# Patient Record
Sex: FEMALE | Race: WHITE | NOT HISPANIC OR LATINO | Employment: FULL TIME | URBAN - METROPOLITAN AREA
[De-identification: names, ages, dates, MRNs, and addresses within clinical notes are randomized per-mention and may not be internally consistent; named-entity substitution may affect disease eponyms.]

---

## 2017-07-27 ENCOUNTER — COMMUNICATION - HEALTHEAST (OUTPATIENT)
Dept: FAMILY MEDICINE | Facility: CLINIC | Age: 42
End: 2017-07-27

## 2017-09-21 ENCOUNTER — OFFICE VISIT - HEALTHEAST (OUTPATIENT)
Dept: FAMILY MEDICINE | Facility: CLINIC | Age: 42
End: 2017-09-21

## 2017-09-21 DIAGNOSIS — Z12.31 ENCOUNTER FOR SCREENING MAMMOGRAM FOR MALIGNANT NEOPLASM OF BREAST: ICD-10-CM

## 2017-09-21 DIAGNOSIS — Z12.39 SCREENING FOR BREAST CANCER: ICD-10-CM

## 2017-09-21 DIAGNOSIS — Z12.4 SCREENING FOR CERVICAL CANCER: ICD-10-CM

## 2017-09-21 DIAGNOSIS — R92.30 DENSE BREASTS: ICD-10-CM

## 2017-09-21 DIAGNOSIS — Z20.9 EXPOSURE TO POTENTIAL INFECTION: ICD-10-CM

## 2017-09-21 DIAGNOSIS — Z00.00 WELL ADULT EXAM: ICD-10-CM

## 2017-09-21 ASSESSMENT — MIFFLIN-ST. JEOR: SCORE: 1467.72

## 2017-09-26 LAB
HPV INTERPRETATION - HISTORICAL: NORMAL
HPV INTERPRETER - HISTORICAL: NORMAL

## 2017-10-02 LAB
BKR LAB AP ABNORMAL BLEEDING: NO
BKR LAB AP BIRTH CONTROL/HORMONES: NORMAL
BKR LAB AP CERVICAL APPEARANCE: NORMAL
BKR LAB AP GYN ADEQUACY: NORMAL
BKR LAB AP GYN INTERPRETATION: NORMAL
BKR LAB AP HPV REFLEX: NORMAL
BKR LAB AP LMP: NORMAL
BKR LAB AP PATIENT STATUS: NORMAL
BKR LAB AP PREVIOUS ABNORMAL: NORMAL
BKR LAB AP PREVIOUS NORMAL: NORMAL
HIGH RISK?: YES
PATH REPORT.COMMENTS IMP SPEC: NORMAL
RESULT FLAG (HE HISTORICAL CONVERSION): NORMAL

## 2017-10-03 ENCOUNTER — COMMUNICATION - HEALTHEAST (OUTPATIENT)
Dept: FAMILY MEDICINE | Facility: CLINIC | Age: 42
End: 2017-10-03

## 2017-10-12 ENCOUNTER — COMMUNICATION - HEALTHEAST (OUTPATIENT)
Dept: FAMILY MEDICINE | Facility: CLINIC | Age: 42
End: 2017-10-12

## 2019-01-29 ENCOUNTER — OFFICE VISIT - HEALTHEAST (OUTPATIENT)
Dept: FAMILY MEDICINE | Facility: CLINIC | Age: 44
End: 2019-01-29

## 2019-01-29 DIAGNOSIS — N94.3 PREMENSTRUAL TENSION SYNDROME: ICD-10-CM

## 2019-01-29 DIAGNOSIS — Z12.39 ENCOUNTER FOR OTHER SCREENING FOR MALIGNANT NEOPLASM OF BREAST: ICD-10-CM

## 2019-01-29 DIAGNOSIS — Z00.00 WELL ADULT EXAM: ICD-10-CM

## 2019-01-29 DIAGNOSIS — F41.9 ANXIETY: ICD-10-CM

## 2019-01-29 DIAGNOSIS — E66.812 CLASS 2 OBESITY WITH BODY MASS INDEX (BMI) OF 37.0 TO 37.9 IN ADULT, UNSPECIFIED OBESITY TYPE, UNSPECIFIED WHETHER SERIOUS COMORBIDITY PRESENT: ICD-10-CM

## 2019-01-29 LAB
ALBUMIN SERPL-MCNC: 3.9 G/DL (ref 3.5–5)
ALP SERPL-CCNC: 65 U/L (ref 45–120)
ALT SERPL W P-5'-P-CCNC: 20 U/L (ref 0–45)
ANION GAP SERPL CALCULATED.3IONS-SCNC: 11 MMOL/L (ref 5–18)
AST SERPL W P-5'-P-CCNC: 12 U/L (ref 0–40)
BASOPHILS # BLD AUTO: 0.1 THOU/UL (ref 0–0.2)
BASOPHILS NFR BLD AUTO: 1 % (ref 0–2)
BILIRUB SERPL-MCNC: 0.6 MG/DL (ref 0–1)
BUN SERPL-MCNC: 15 MG/DL (ref 8–22)
CALCIUM SERPL-MCNC: 9.7 MG/DL (ref 8.5–10.5)
CHLORIDE BLD-SCNC: 107 MMOL/L (ref 98–107)
CO2 SERPL-SCNC: 22 MMOL/L (ref 22–31)
CREAT SERPL-MCNC: 0.8 MG/DL (ref 0.6–1.1)
CREAT UR-MCNC: 182.8 MG/DL
CRP SERPL HS-MCNC: 6.7 MG/L (ref 0–3)
EOSINOPHIL # BLD AUTO: 0.1 THOU/UL (ref 0–0.4)
EOSINOPHIL NFR BLD AUTO: 2 % (ref 0–6)
ERYTHROCYTE [DISTWIDTH] IN BLOOD BY AUTOMATED COUNT: 12.7 % (ref 11–14.5)
GFR SERPL CREATININE-BSD FRML MDRD: >60 ML/MIN/1.73M2
GLUCOSE BLD-MCNC: 93 MG/DL (ref 70–125)
HCT VFR BLD AUTO: 38 % (ref 35–47)
HGB BLD-MCNC: 12.4 G/DL (ref 12–16)
INSULIN SERPL-ACNC: 20.1 MU/L (ref 3–25)
IRON SATN MFR SERPL: 15 % (ref 20–50)
IRON SERPL-MCNC: 62 UG/DL (ref 42–175)
LYMPHOCYTES # BLD AUTO: 1.8 THOU/UL (ref 0.8–4.4)
LYMPHOCYTES NFR BLD AUTO: 24 % (ref 20–40)
MCH RBC QN AUTO: 28.7 PG (ref 27–34)
MCHC RBC AUTO-ENTMCNC: 32.6 G/DL (ref 32–36)
MCV RBC AUTO: 88 FL (ref 80–100)
MICROALBUMIN UR-MCNC: 1.45 MG/DL (ref 0–1.99)
MICROALBUMIN/CREAT UR: 7.9 MG/G
MONOCYTES # BLD AUTO: 0.5 THOU/UL (ref 0–0.9)
MONOCYTES NFR BLD AUTO: 7 % (ref 2–10)
NEUTROPHILS # BLD AUTO: 5.1 THOU/UL (ref 2–7.7)
NEUTROPHILS NFR BLD AUTO: 67 % (ref 50–70)
PLATELET # BLD AUTO: 352 THOU/UL (ref 140–440)
PMV BLD AUTO: 7.2 FL (ref 7–10)
POTASSIUM BLD-SCNC: 4.8 MMOL/L (ref 3.5–5)
PROT SERPL-MCNC: 7.1 G/DL (ref 6–8)
RBC # BLD AUTO: 4.32 MILL/UL (ref 3.8–5.4)
SODIUM SERPL-SCNC: 140 MMOL/L (ref 136–145)
THYROID PEROXIDASE ANTIBODIES - HISTORICAL: 5.3 IU/ML (ref 0–5.6)
TIBC SERPL-MCNC: 423 UG/DL (ref 313–563)
TRANSFERRIN SERPL-MCNC: 338 MG/DL (ref 212–360)
TSH SERPL DL<=0.005 MIU/L-ACNC: 1.3 UIU/ML (ref 0.3–5)
VIT B12 SERPL-MCNC: 246 PG/ML (ref 213–816)
WBC: 7.6 THOU/UL (ref 4–11)

## 2019-01-29 ASSESSMENT — MIFFLIN-ST. JEOR: SCORE: 1580.76

## 2019-01-30 LAB
25(OH)D3 SERPL-MCNC: 19.4 NG/ML (ref 30–80)
DHEA-S SERPL-MCNC: 114 UG/DL (ref 32–240)

## 2019-02-01 LAB
CHOLEST SERPL-MCNC: 163 MG/DL
HDL SERPL QN: 10.1 NM
HDL SERPL-SCNC: 40.7 UMOL/L
HDLC SERPL-MCNC: 78 MG/DL (ref 40–59)
HLD.LARGE SERPL-SCNC: 14.8 UMOL/L
LDL SERPL QN: 21.7 NM
LDL SERPL-SCNC: 735 NMOL/L
LDL SMALL SERPL-SCNC: ABNORMAL NMOL/L
LDLC SERPL CALC-MCNC: 63 MG/DL
PATHOLOGY STUDY: ABNORMAL
TRIGL SERPL-MCNC: 110 MG/DL (ref 30–149)
VLDL LARGE SERPL-SCNC: 4.3 NMOL/L
VLDL SERPL QN: 50.2 NM

## 2019-02-09 ENCOUNTER — COMMUNICATION - HEALTHEAST (OUTPATIENT)
Dept: FAMILY MEDICINE | Facility: CLINIC | Age: 44
End: 2019-02-09

## 2019-02-20 ENCOUNTER — HOSPITAL ENCOUNTER (OUTPATIENT)
Dept: MAMMOGRAPHY | Facility: CLINIC | Age: 44
Discharge: HOME | End: 2019-02-20
Attending: FAMILY MEDICINE

## 2019-02-20 DIAGNOSIS — Z12.39 ENCOUNTER FOR OTHER SCREENING FOR MALIGNANT NEOPLASM OF BREAST: ICD-10-CM

## 2019-02-28 ENCOUNTER — COMMUNICATION - HEALTHEAST (OUTPATIENT)
Dept: MAMMOGRAPHY | Facility: CLINIC | Age: 44
End: 2019-02-28

## 2020-04-23 ENCOUNTER — COMMUNICATION - HEALTHEAST (OUTPATIENT)
Dept: FAMILY MEDICINE | Facility: CLINIC | Age: 45
End: 2020-04-23

## 2020-07-14 ENCOUNTER — OFFICE VISIT - HEALTHEAST (OUTPATIENT)
Dept: FAMILY MEDICINE | Facility: CLINIC | Age: 45
End: 2020-07-14

## 2020-07-14 DIAGNOSIS — Z11.4 SCREENING FOR HIV (HUMAN IMMUNODEFICIENCY VIRUS): ICD-10-CM

## 2020-07-14 DIAGNOSIS — F90.9 ATTENTION DEFICIT HYPERACTIVITY DISORDER (ADHD), UNSPECIFIED ADHD TYPE: ICD-10-CM

## 2020-07-14 DIAGNOSIS — Z20.9 EXPOSURE TO POTENTIAL INFECTION: ICD-10-CM

## 2020-07-14 DIAGNOSIS — E66.812 CLASS 2 OBESITY WITH BODY MASS INDEX (BMI) OF 38.0 TO 38.9 IN ADULT, UNSPECIFIED OBESITY TYPE, UNSPECIFIED WHETHER SERIOUS COMORBIDITY PRESENT: ICD-10-CM

## 2020-07-14 ASSESSMENT — MIFFLIN-ST. JEOR: SCORE: 1607.97

## 2020-07-14 NOTE — ASSESSMENT & PLAN NOTE
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

## 2020-07-15 LAB — HIV 1+2 AB+HIV1 P24 AG SERPL QL IA: NEGATIVE

## 2020-07-16 LAB — HEPATITIS B SURFACE ANTIBODY LHE- HISTORICAL: POSITIVE

## 2020-07-17 LAB
GAMMA INTERFERON BACKGROUND BLD IA-ACNC: 0.05 IU/ML
M TB IFN-G BLD-IMP: NEGATIVE
MITOGEN IGNF BCKGRD COR BLD-ACNC: -0.01 IU/ML
MITOGEN IGNF BCKGRD COR BLD-ACNC: 0 IU/ML
QTF INTERPRETATION: NORMAL
QTF MITOGEN - NIL: 6.85 IU/ML
VZV IGG SER QL IA: POSITIVE

## 2020-07-18 ENCOUNTER — COMMUNICATION - HEALTHEAST (OUTPATIENT)
Dept: FAMILY MEDICINE | Facility: CLINIC | Age: 45
End: 2020-07-18

## 2020-08-26 ENCOUNTER — RECORDS - HEALTHEAST (OUTPATIENT)
Dept: ADMINISTRATIVE | Facility: OTHER | Age: 45
End: 2020-08-26

## 2020-10-19 ENCOUNTER — COMMUNICATION - HEALTHEAST (OUTPATIENT)
Dept: FAMILY MEDICINE | Facility: CLINIC | Age: 45
End: 2020-10-19

## 2020-10-19 DIAGNOSIS — F90.9 ATTENTION DEFICIT HYPERACTIVITY DISORDER (ADHD), UNSPECIFIED ADHD TYPE: ICD-10-CM

## 2020-10-22 ENCOUNTER — AMBULATORY - HEALTHEAST (OUTPATIENT)
Dept: FAMILY MEDICINE | Facility: CLINIC | Age: 45
End: 2020-10-22

## 2020-10-22 DIAGNOSIS — F90.9 ATTENTION DEFICIT HYPERACTIVITY DISORDER (ADHD), UNSPECIFIED ADHD TYPE: ICD-10-CM

## 2020-10-24 ENCOUNTER — COMMUNICATION - HEALTHEAST (OUTPATIENT)
Dept: FAMILY MEDICINE | Facility: CLINIC | Age: 45
End: 2020-10-24

## 2020-10-24 DIAGNOSIS — F90.9 ATTENTION DEFICIT HYPERACTIVITY DISORDER (ADHD), UNSPECIFIED ADHD TYPE: ICD-10-CM

## 2020-11-03 ENCOUNTER — COMMUNICATION - HEALTHEAST (OUTPATIENT)
Dept: FAMILY MEDICINE | Facility: CLINIC | Age: 45
End: 2020-11-03

## 2020-11-03 DIAGNOSIS — F90.9 ATTENTION DEFICIT HYPERACTIVITY DISORDER (ADHD), UNSPECIFIED ADHD TYPE: ICD-10-CM

## 2020-12-04 ENCOUNTER — AMBULATORY - HEALTHEAST (OUTPATIENT)
Dept: FAMILY MEDICINE | Facility: CLINIC | Age: 45
End: 2020-12-04

## 2020-12-04 DIAGNOSIS — Z72.0 TOBACCO USE: ICD-10-CM

## 2020-12-04 DIAGNOSIS — Z71.6 ENCOUNTER FOR TOBACCO USE CESSATION COUNSELING: ICD-10-CM

## 2021-01-23 ENCOUNTER — COMMUNICATION - HEALTHEAST (OUTPATIENT)
Dept: FAMILY MEDICINE | Facility: CLINIC | Age: 46
End: 2021-01-23

## 2021-01-26 ENCOUNTER — AMBULATORY - HEALTHEAST (OUTPATIENT)
Dept: NURSING | Facility: CLINIC | Age: 46
End: 2021-01-26

## 2021-02-15 ENCOUNTER — COMMUNICATION - HEALTHEAST (OUTPATIENT)
Dept: FAMILY MEDICINE | Facility: CLINIC | Age: 46
End: 2021-02-15

## 2021-02-16 ENCOUNTER — AMBULATORY - HEALTHEAST (OUTPATIENT)
Dept: NURSING | Facility: CLINIC | Age: 46
End: 2021-02-16

## 2021-03-18 ENCOUNTER — COMMUNICATION - HEALTHEAST (OUTPATIENT)
Dept: FAMILY MEDICINE | Facility: CLINIC | Age: 46
End: 2021-03-18

## 2021-04-01 ENCOUNTER — COMMUNICATION - HEALTHEAST (OUTPATIENT)
Dept: FAMILY MEDICINE | Facility: CLINIC | Age: 46
End: 2021-04-01

## 2021-04-01 DIAGNOSIS — F90.9 ATTENTION DEFICIT HYPERACTIVITY DISORDER (ADHD), UNSPECIFIED ADHD TYPE: ICD-10-CM

## 2021-04-04 RX ORDER — DEXTROAMPHETAMINE SACCHARATE, AMPHETAMINE ASPARTATE MONOHYDRATE, DEXTROAMPHETAMINE SULFATE AND AMPHETAMINE SULFATE 3.75; 3.75; 3.75; 3.75 MG/1; MG/1; MG/1; MG/1
15 CAPSULE, EXTENDED RELEASE ORAL DAILY
Qty: 90 CAPSULE | Refills: 0 | Status: SHIPPED | OUTPATIENT
Start: 2021-04-04 | End: 2022-05-28

## 2021-04-12 ENCOUNTER — OFFICE VISIT - HEALTHEAST (OUTPATIENT)
Dept: FAMILY MEDICINE | Facility: CLINIC | Age: 46
End: 2021-04-12

## 2021-04-12 DIAGNOSIS — Z11.59 ENCOUNTER FOR HEPATITIS C SCREENING TEST FOR LOW RISK PATIENT: ICD-10-CM

## 2021-04-12 DIAGNOSIS — F90.9 ATTENTION DEFICIT HYPERACTIVITY DISORDER (ADHD), UNSPECIFIED ADHD TYPE: ICD-10-CM

## 2021-04-12 NOTE — ASSESSMENT & PLAN NOTE
"Really great \"  Meds  Tie >> helps so do not binge eat  Increased focus\"    Down sides   \"have not found any\"  Sometimes I forget    \"can't take past 10Am >> won't sleep   12 hours     Heart Palpitation  No   GI No   No Sweats       Covered?  only name brand   "

## 2021-05-25 ENCOUNTER — RECORDS - HEALTHEAST (OUTPATIENT)
Dept: ADMINISTRATIVE | Facility: CLINIC | Age: 46
End: 2021-05-25

## 2021-05-31 ENCOUNTER — RECORDS - HEALTHEAST (OUTPATIENT)
Dept: ADMINISTRATIVE | Facility: CLINIC | Age: 46
End: 2021-05-31

## 2021-05-31 VITALS — WEIGHT: 187.08 LBS | BODY MASS INDEX: 33.15 KG/M2 | HEIGHT: 63 IN

## 2021-06-01 ENCOUNTER — RECORDS - HEALTHEAST (OUTPATIENT)
Dept: ADMINISTRATIVE | Facility: CLINIC | Age: 46
End: 2021-06-01

## 2021-06-02 VITALS — WEIGHT: 212 LBS | BODY MASS INDEX: 37.56 KG/M2 | HEIGHT: 63 IN

## 2021-06-04 VITALS
DIASTOLIC BLOOD PRESSURE: 80 MMHG | BODY MASS INDEX: 38.62 KG/M2 | SYSTOLIC BLOOD PRESSURE: 110 MMHG | OXYGEN SATURATION: 99 % | WEIGHT: 218 LBS | HEART RATE: 89 BPM | HEIGHT: 63 IN

## 2021-06-09 NOTE — PROGRESS NOTES
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    Diagnoses and all orders for this visit:    Exposure to potential infection  -     Hepatitis B Surface Antibody (Anti-HBs)  -     Varicella Zoster Antibody, IgG  -     QTF-Mycobacterium tuberculosis by QuantiFERON-TB Gold Plus    Class 2 obesity with body mass index (BMI) of 38.0 to 38.9 in adult, unspecified obesity type, unspecified whether serious comorbidity present    Screening for HIV (human immunodeficiency virus)  -     HIV Antigen/Antibody Screening [place]    Attention deficit hyperactivity disorder (ADHD), unspecified ADHD type  -     dextroamphetamine-amphetamine (ADDERALL XR) 15 MG 24 hr capsule; Take 1 capsule (15 mg total) by mouth daily.        Patient Instructions   Mammogram consider doing a 3D mammogram this year or for sure next year  Any changes in your breast eyes get checked out    Pap smear is due in 2022        For attention deficit issues  Adderall 15 mg at 8:00  Let me know about side effects    Consider green tea extract  Another consideration berberine 500 mg 2 tablets daily for up to 3 months    Definitely stress and cortisol levels plan to weight gain and weight retention       Consider doing testing down the line for adrenal fatigue      Supplements for adrenal support  These can be used in addition to lifestyle interventions such as mindfulness  exercises, meditation, cardiovascular low impact exercise, and restorative sleep      Therapies for Supporting  Mood Regulation     5 HTP 50 -400 mg Daily  L Taurine 100- 1000 mg Daily  Pharma TERENCE 100- 400 mg Daily  L Theanine 100 - 600 mg Daily  Green tea  L Tyrosine  200 - 2000 mg Daily  Magnesium Glycinate or Citrate 400 mg Daily  5 Methytetrahydrofolate 400 mcg to 1 mg daily  Inositol 1000 0 59027 mg daily    Paula Adrenal Health  Life Extension Adrenal Formula    Therapies to Support Sleep    5 HTp 100 - 500mg Daily  L Theanine 100 - 600 mg Daily  PharmaGABA 100 - 600 mg Daily  Calcium 200 - 1000 mg Daily  Magnesium (prefer Glycinate if no constipation and Citrate if constipation) 100 - 800 mg Daily  Phosphatidyl serine 500 mg Daily  Valerian Root \Passion Flower \Jujube Extract    Herbs to help with  Inflammation  If Elevated hs CRP    Turmeric 600-1200 mg Daily  SkullCap 200 - 600m Daily  Green tea or Extract 100 - 300 mg Daily  Quercitin 200 - 600 mg Daily   Onion   Vitamin D / K2  5000/ Daily goal level 60   Bromelain 240 - 500 mg Daily    Over-the-counter examples of adrenal support supplements:    These are Whole Foods brands      Whole Foods Brand 25 $  2 months supply  Stress Support  Ashwagandha  Lemon [place]  Bacopa  Passion Flower  RHodiola  Holy Basil    Adrenal Support Whole Foods 25$ 2 months supply  Ashwagandha  Eleuthero  Cordyceps  Schizandra  American Ginseng  Chinese Ginseng  Rhodiola                        Work on weight re-balance!    Taking control of your weight and weight re-balance:    1 eat a balanced breakfast every morning consider intermittent fasting with no food before 12:00 and no food after 8:00 cluster eating for an 8-hour.  Low sugar  Low alcohol  Focusing on whole foods  Trying to increase the fiber in your diet up to 30 g daily    Focused Nutrition:  Eat Clean and Whole Foods  These are single ingredient foods that possess vital nutrients which can touch and affect our  "health in a positive manner.            Lean Meats Protein and Fat---- Nuts, Eggs,Veggies, Fish and Lean Meats especially fish and poultry. Meat and Eggs in their natural form have No Sugar.  Try to choose good fat from Fish, Nuts, Avocados, Extra Virgin Olive Oil (not cooked) other vegetables for example. Opt for Plant Proteins and Fats over Animal Fats      For High Protein Eat---- Meats, Fish, Lean Meats, Beans, Nuts, and Quinoa/ other Whole grains    Focus on \"Whole Foods\":  You know what the food is by looking at it and comes from a plant, tree, animal or the sea.  Single source organically grown if possible.    Look for Low Glycemic foods:  Try to Avoid foods with any added sugars and absolutely  no High Fructose Corn Syrup    Lean Meats Protein and Fat: These in their natural form have No Sugar.  Go for the good fat from Fish, Nuts, Avocados, Extra Virgin Olive Oil (not cooked) other vegetables for example. Opt for Plant Proteins and Fats over Animal Fats    Beans are excellent complex carbohydrates with fiber- black, garbanzo, lentil, kidney, lima, Richard, Underwood    Simple flours and breads, sigh,  in general are simple sugars, when processed and should be avoided.  However there are beneficial fibers in wholes grains.  So if choosing, go for the 100% whole-grain Grains --Bran, brown rice, Flax----Fiber offsets glycemic affect    Vegetables: most vegetables are low glycemic with the exception of starchy ones:  potatoes, carrots, corn, and beats    Whole Fruits mostly are low glycemic:  try to eat his snacks: Try not to pair with fat.  Insulin burns sugar and stores fat as a job     High glycemic fruits: Dates, Watermelon, Figgs, Apricots, Raisins but in moderation OK    For Snacks go for nuts and seeds unless you are instructed not to do so due to another health condition; please although tempting avoid corn chips, jellybeans, pretzels other processed snacks that usually pair sugar and fat.      Probiotics and " Prebiotics support digestion and our immune system!    Foods that support this are, among others:    Pros: Kefir, Kombucha, Miso, Pickled Vegetables, Matt Kraut, Yogurt, Tempeh,     Pre: Asparagus, Bananas, Eggplant, Garlic, Honey, Kefir, Leeks, Legumes, Onions, Peas, Whole Grains, and Yogurt    Nutrients support our cellular machinery:  High nutrient food support this.    Rest helps digest.  We need restorative sleep  8- 10 hours.  We should also try to have 10 to 12 hours at some point between meals, e.g. 7 PM to 7 AM     Fiber: Lino Seed or Flax Seed or Hemp Seeds:  2-3 tablespoons daily for fiber and Omega 3s      Move everyday your body and sweat!    Get good 8 to 10 hours of Sleep and Hydrate with Water      If your Triglycerides are High:    Look into a Ketogenic Diet    Always choose --No added Sugar..    Fish Oil 2000 mg Daily and/or Flax or Lino seeds  Milled 2 tablespoons                            Daily    Lino seed in [place] Milk over night to make pudding    Nuts Especially walnuts.    Fish especially  [place], Mackerel, Anchovy,Sardine and Herring    Vegetables opt for multiple colors daily  New Goal 3- 5 cups of fruits and                         veggies Daily!!      Fermented Foods Rock!  You can do your own preserving and culturing of good bacteria!      Drink fermented Kombuchaa  Eat Cultured vegetable like Kimchi or Sauerkraut  Apple Cider Vinegar Unfiltered can be added 8 oz fizzy water  Foods:  Beets, Celery, Lemon, Lime, Grapefruit, Cucumber and Carrots    Use Bitter Herbs:  Irma, Arugala, Cilantro, Turmeric, Dandelion, CUmin, Fennel, Mint, Leeks, Parsley, and Milk THistle    Practice Fastin hours from last meal in evening to first meal in morming    Chlorophyll Rich Foods may help, add to water CHorella or Spirulina    Eat Kale and Broccoli Sprouts --- Sulfurophane rich      Eat Cruciferous Vegetables Daily:    Broccoli, Cauliflower, Kale, Collards, Brussel Sprouts    Eat Lots of  "Fiber:      Soluble:   Lino, Flax Hemp, Pumpkin Seeds  Insoluble:  Fruits and Veggies  Best sources of  Chicory Root Ground, Dandelion Root, Asparagus, Leeks and Onion, Bananas ( more green), Sprouted Wheat eg Med Bread , Garlic, [place] Artichokes,)       Lastly we have to think of things that are toxic to our health, which may contribute to poor health and disease.  An apple covered in pesticides has both healthy and toxic components for our system.  The very \"healthy choices\" may be laced with toxins.  So choose foods wisely and discriminatingly.      Here are some recommendations about when and when not to choose organic foods.  When in doubt wash!      The group identified the following items on its  Dirty Dozen  list of produce with the most pesticide residue:   1. Strawberries  2. Spinach  3. Nectarines  4. Apples  5. Grapes  6. Peaches  7. Cherries  8. Pears  9. Tomatoes  10. Celery  11. Potatoes  12. Sweet Bell Peppers  Here are the items the EWG identified for its  Clean 15,  which report the least likelihood to contain pesticide residue.  1. Avocados  2. Sweet Corn  3. Pineapples  4. Cabbages  5. Onions  6. Sweet Peas  7. Papayas  8. Asparagus  9. Mangoes  10. Eggplants  11. Honeydews  12. Kiwis  13. Cantaloupes  14. Cauliflower  15. Broccoli          Eat well, Get Good Sleep and Stay Active!    Rebecca Fernandez        Return in about 1 month (around 8/14/2020) for my chart follow medication side effects.       Little interest or pleasure in doing things: Not at all  Feeling down, depressed, or hopeless: Not at all    CHIEF COMPLAINT: Yamilet Christensen had concerns including Annual Exam (non fasting, vaccines for NP school ,Tb gold ).    [place]: 1.............. SUBJECTIVE:  Yamilet Christensen is a 44 y.o. female had concerns including Annual Exam (non fasting, vaccines for NP school ,Tb gold ).    1. Exposure to potential infection    2. Class 2 obesity with body mass index (BMI) of 38.0 to 38.9 in adult, " "unspecified obesity type, unspecified whether serious comorbidity present    3. Screening for HIV (human immunodeficiency virus)    4. Attention deficit hyperactivity disorder (ADHD), unspecified ADHD type          Allergies   Allergen Reactions     Sulfa (Sulfonamide Antibiotics)                          SOCIAL: She  reports that she has never smoked. She has never used smokeless tobacco.    REVIEW OF SYSTEMS:   Family history not pertinent to chief complaint or presenting problem    Review of systems otherwise negative as requested from patient, except   Those positive ROS outlined and discussed in [place].      VITALS:  Vitals:    07/14/20 1619   BP: 110/80   Pulse: 89   SpO2: 99%   Weight: 218 lb (98.9 kg)   Height: 5' 3\" (1.6 m)     Wt Readings from Last 3 Encounters:   07/14/20 218 lb (98.9 kg)   01/29/19 212 lb (96.2 kg)   09/21/17 187 lb 1.3 oz (84.9 kg)     Body mass index is 38.62 kg/m .    Physical Exam:  Oropharynx clear  TMs are clear  Tongue-tied  No cervical or supraclavicular nodes  Thyroid prep nontender without nodules  Lungs are clear  Cardiac no murmur  No appreciable dysplastic nevi of the back  No lower extremity edema  Mild valgus deformity of the knees  Truncal obesity  BMI 38       I spent 35 minutes with this patient face to face, of which 50% or greater was spent in counseling and coordination of care with regards to Yamilet was seen today for annual exam.    Diagnoses and all orders for this visit:    Exposure to potential infection  -     Hepatitis B Surface Antibody (Anti-HBs)  -     Varicella Zoster Antibody, IgG  -     QTF-Mycobacterium tuberculosis by QuantiFERON-TB Gold Plus    Class 2 obesity with body mass index (BMI) of 38.0 to 38.9 in adult, unspecified obesity type, unspecified whether serious comorbidity present    Screening for HIV (human immunodeficiency virus)  -     HIV Antigen/Antibody Screening [place]    Attention deficit hyperactivity disorder (ADHD), unspecified ADHD " type  -     dextroamphetamine-amphetamine (ADDERALL XR) 15 MG 24 hr capsule; Take 1 capsule (15 mg total) by mouth daily.        Mahendra Arceo MD  [facility] [address] [phone]

## 2021-06-09 NOTE — PATIENT INSTRUCTIONS - HE
Mammogram consider doing a 3D mammogram this year or for sure next year  Any changes in your breast eyes get checked out    Pap smear is due in 2022        For attention deficit issues  Adderall 15 mg at 8:00  Let me know about side effects    Consider green tea extract  Another consideration berberine 500 mg 2 tablets daily for up to 3 months    Definitely stress and cortisol levels plan to weight gain and weight retention       Consider doing testing down the line for adrenal fatigue      Supplements for adrenal support  These can be used in addition to lifestyle interventions such as mindfulness exercises, meditation, cardiovascular low impact exercise, and restorative sleep      Therapies for Supporting  Mood Regulation     5 HTP 50 -400 mg Daily  L Taurine 100- 1000 mg Daily  Pharma TERENCE 100- 400 mg Daily  L Theanine 100 - 600 mg Daily  Green tea  L Tyrosine  200 - 2000 mg Daily  Magnesium Glycinate or Citrate 400 mg Daily  5 Methytetrahydrofolate 400 mcg to 1 mg daily  Inositol 1000 0 84475 mg daily    Paula Adrenal Health  Life Extension Adrenal Formula    Therapies to Support Sleep    5 HTp 100 - 500mg Daily  L Theanine 100 - 600 mg Daily  PharmaGABA 100 - 600 mg Daily  Calcium 200 - 1000 mg Daily  Magnesium (prefer Glycinate if no constipation and Citrate if constipation) 100 - 800 mg Daily  Phosphatidyl serine 500 mg Daily  Valerian Root \Passion Flower \Jujube Extract    Herbs to help with  Inflammation  If Elevated hs CRP    Turmeric 600-1200 mg Daily  SkullCap 200 - 600m Daily  Green tea or Extract 100 - 300 mg Daily  Quercitin 200 - 600 mg Daily   Onion   Vitamin D / K2  5000/ Daily goal level 60   Bromelain 240 - 500 mg Daily    Over-the-counter examples of adrenal support supplements:    These are Whole Foods brands      Whole Foods Brand 25 $  2 months supply  Stress Support  Ashwagandha  Lemon [place]  Bacopa  Passion Flower  RHodiola  Holy Basil    Adrenal Support Whole Foods 25$ 2 months  "supply  Thomas Veloz  Schizandra  American Ginseng  Chinese Ginseng  Rhodiola                        Work on weight re-balance!    Taking control of your weight and weight re-balance:    1 eat a balanced breakfast every morning consider intermittent fasting with no food before 12:00 and no food after 8:00 cluster eating for an 8-hour.  Low sugar  Low alcohol  Focusing on whole foods  Trying to increase the fiber in your diet up to 30 g daily    Focused Nutrition:  Eat Clean and Whole Foods  These are single ingredient foods that possess vital nutrients which can touch and affect our health in a positive manner.            Lean Meats Protein and Fat---- Nuts, Eggs,Veggies, Fish and Lean Meats especially fish and poultry. Meat and Eggs in their natural form have No Sugar.  Try to choose good fat from Fish, Nuts, Avocados, Extra Virgin Olive Oil (not cooked) other vegetables for example. Opt for Plant Proteins and Fats over Animal Fats      For High Protein Eat---- Meats, Fish, Lean Meats, Beans, Nuts, and Quinoa/ other Whole grains    Focus on \"Whole Foods\":  You know what the food is by looking at it and comes from a plant, tree, animal or the sea.  Single source organically grown if possible.    Look for Low Glycemic foods:  Try to Avoid foods with any added sugars and absolutely  no High Fructose Corn Syrup    Lean Meats Protein and Fat: These in their natural form have No Sugar.  Go for the good fat from Fish, Nuts, Avocados, Extra Virgin Olive Oil (not cooked) other vegetables for example. Opt for Plant Proteins and Fats over Animal Fats    Beans are excellent complex carbohydrates with fiber- black, garbanzo, lentil, kidney, lima, Richard, [place]    Simple flours and breads, sigh,  in general are simple sugars, when processed and should be avoided.  However there are beneficial fibers in wholes grains.  So if choosing, go for the 100% whole-grain Grains --Bran, brown rice, Flax----Fiber offsets " glycemic affect    Vegetables: most vegetables are low glycemic with the exception of starchy ones:  potatoes, carrots, corn, and beats    Whole Fruits mostly are low glycemic:  try to eat his snacks: Try not to pair with fat.  Insulin burns sugar and stores fat as a job     High glycemic fruits: Dates, Watermelon, Figgs, Apricots, Raisins but in moderation OK    For Snacks go for nuts and seeds unless you are instructed not to do so due to another health condition; please although tempting avoid corn chips, jellybeans, pretzels other processed snacks that usually pair sugar and fat.      Probiotics and Prebiotics support digestion and our immune system!    Foods that support this are, among others:    Pros: Kefir, Kombucha, Miso, Pickled Vegetables, Matt Kraut, Yogurt, Tempeh,     Pre: Asparagus, Bananas, Eggplant, Garlic, Honey, Kefir, Leeks, Legumes, Onions, Peas, Whole Grains, and Yogurt    Nutrients support our cellular machinery:  High nutrient food support this.    Rest helps digest.  We need restorative sleep  8- 10 hours.  We should also try to have 10 to 12 hours at some point between meals, e.g. 7 PM to 7 AM     Fiber: Lino Seed or Flax Seed or Hemp Seeds:  2-3 tablespoons daily for fiber and Omega 3s      Move everyday your body and sweat!    Get good 8 to 10 hours of Sleep and Hydrate with Water      If your Triglycerides are High:    Look into a Ketogenic Diet    Always choose --No added Sugar..    Fish Oil 2000 mg Daily and/or Flax or Lino seeds  Milled 2 tablespoons                            Daily    Lino seed in [place] Milk over night to make pudding    Nuts Especially walnuts.    Fish especially  [place], Mackerel, Anchovy,Sardine and Herring    Vegetables opt for multiple colors daily  New Goal 3- 5 cups of fruits and                         veggies Daily!!      Fermented Foods Rock!  You can do your own preserving and culturing of good bacteria!      Drink fermented Kombuchaa  Eat Cultured  "vegetable like Kimchi or Sauerkraut  Apple Cider Vinegar Unfiltered can be added 8 oz fizzy water  Foods:  Beets, Celery, Lemon, Lime, Grapefruit, Cucumber and Carrots    Use Bitter Herbs:  Irma, Arugala, Cilantro, Turmeric, Dandelion, CUmin, Fennel, Mint, Leeks, Parsley, and Milk THistle    Practice Fastin hours from last meal in evening to first meal in morming    Chlorophyll Rich Foods may help, add to water CHorella or Spirulina    Eat Kale and Broccoli Sprouts --- Sulfurophane rich      Eat Cruciferous Vegetables Daily:    Broccoli, Cauliflower, Kale, Collards, Brussel Sprouts    Eat Lots of Fiber:      Soluble:   Lino, Flax Hemp, Pumpkin Seeds  Insoluble:  Fruits and Veggies  Best sources of  Chicory Root Ground, Dandelion Root, Asparagus, Leeks and Onion, Bananas ( more green), Sprouted Wheat eg Med Bread , Garlic, [place] Artichokes,)       Lastly we have to think of things that are toxic to our health, which may contribute to poor health and disease.  An apple covered in pesticides has both healthy and toxic components for our system.  The very \"healthy choices\" may be laced with toxins.  So choose foods wisely and discriminatingly.      Here are some recommendations about when and when not to choose organic foods.  When in doubt wash!      The group identified the following items on its  Dirty Dozen  list of produce with the most pesticide residue:   1. Strawberries  2. Spinach  3. Nectarines  4. Apples  5. Grapes  6. Peaches  7. Cherries  8. Pears  9. Tomatoes  10. Celery  11. Potatoes  12. Sweet Bell Peppers  Here are the items the EWG identified for its  Clean 15,  which report the least likelihood to contain pesticide residue.  1. Avocados  2. Sweet Corn  3. Pineapples  4. Cabbages  5. Onions  6. Sweet Peas  7. Papayas  8. Asparagus  9. Mangoes  10. Eggplants  11. Honeydews  12. Kiwis  13. Cantaloupes  14. Cauliflower  15. Broccoli          Eat well, Get Good Sleep and Stay " Active!    DanL      DanL

## 2021-06-12 NOTE — TELEPHONE ENCOUNTER
Controlled Substance Refill Request  Medication Name:   Requested Prescriptions     Pending Prescriptions Disp Refills     dextroamphetamine-amphetamine (ADDERALL XR) 15 MG 24 hr capsule 90 capsule 0     Sig: Take 1 capsule (15 mg total) by mouth daily.     Date Last Fill: 10/26/20  Requested Pharmacy: [facility]  Submit electronically to pharmacy  Controlled Substance Agreement on file:   Encounter-Level CSA Scan Date:    There are no encounter-level csa scan date.        Last office visit:  7/14/20

## 2021-06-12 NOTE — TELEPHONE ENCOUNTER
Controlled Substance Refill Request  Medication Name:   Requested Prescriptions     Pending Prescriptions Disp Refills     dextroamphetamine-amphetamine (ADDERALL XR) 15 MG 24 hr capsule 90 capsule 0     Sig: Take 1 capsule (15 mg total) by mouth daily.     Date Last Fill: 7/14/20  Requested Pharmacy: [facility]  Submit electronically to pharmacy  Controlled Substance Agreement on file:   Encounter-Level CSA Scan Date:    There are no encounter-level csa scan date.        Last office visit:  7/14/20

## 2021-06-13 NOTE — PROGRESS NOTES
CHIEF COMPLAINT: Yamilet Christensen had concerns including Annual Exam.    Circle: 1.............. had concerns including Annual Exam.    1. Dense breasts    2. Well adult exam    3. Screening for cervical cancer    4. Exposure to potential infection    5. Screening for breast cancer    6. Encounter for screening mammogram for malignant neoplasm of breast      No problem-specific Assessment & Plan notes found for this encounter.      CC:              Physical    No complaints  No family history of breast: Skin ovarian cervical cancer  Sister with hypertension  2 children   safe relationship business owner      SUBJECTIVE:  Yamilet Christensen is a 41 y.o. female    No past medical history on file.  Past Surgical History:   Procedure Laterality Date     KS REDUCTION OF LARGE BREAST      Description: Breast Surgery Reduction Procedure;  Recorded: 07/24/2008;     Sulfa (sulfonamide antibiotics)  No current outpatient prescriptions on file.     No current facility-administered medications for this visit.      No family history on file.  Social History     Social History     Marital status:      Spouse name: N/A     Number of children: N/A     Years of education: N/A     Social History Main Topics     Smoking status: Never Smoker     Smokeless tobacco: None     Alcohol use None     Drug use: None     Sexual activity: Not Asked     Other Topics Concern     None     Social History Narrative     Patient Active Problem List   Diagnosis     Dysplastic Nevus     Premenstrual Disorder     Well adult exam                                              SOCIAL: She  reports that she has never smoked. She does not have any smokeless tobacco history on file.    REVIEW OF SYSTEMS:     FAMILY HISTORY NOT PERTINENT TO CHIEF COMPLAINT OR PRESENTING PROBLEM  Review of systems otherwise negative as requested from patient, except   Positive ROS outlined and discussed in Circle.    OBJECTIVE:  /72 (Patient Site: Left Arm, Patient  "Position: Sitting, Cuff Size: Adult Large)  Pulse 88  Temp 98.7  F (37.1  C) (Oral)   Resp 17  Ht 5' 3\" (1.6 m)  Wt 187 lb 1.3 oz (84.9 kg)  LMP 08/31/2017  BMI 33.14 kg/m2    GENERAL:     No acute distress.   Alert and oriented X 3         Physical:    Sclera clear  TMs are clear  His mucosa is clear  Oropharynx is clear  Neck is supple no cervical supraclavicular clavicular nodes  Thyroid prep nontender without nodules  Abdomen soft nontender with positive bowel sounds extremities are tender without lesions Pap smear taken wet prep taken bimanual exam no negative self ulcer tenderness  No skin dysplastic nevi  No skin rash  No lower extremity edema normal distal pulses next      ASSESSMENT & PLAN      Yamilet was seen today for annual exam.    Diagnoses and all orders for this visit:    Dense breasts  -     Mammo Screening Bilateral; Future    Well adult exam  -     NMR with Lipid  -     Comprehensive Metabolic Panel  -     HM1(CBC and Differential)  -     C -Reactive Protein, High Sensitivity  -     Vitamin D, Total (25-Hydroxy)  -     Urinalysis-UC if Indicated  -     Thyroid Cascade  -     HM1 (CBC with Diff)    Screening for cervical cancer  -     Gynecologic Cytology (PAP Smear)    Exposure to potential infection  -     Wet Prep, Vaginal    Screening for breast cancer    Encounter for screening mammogram for malignant neoplasm of breast  -     Mammo Screening Bilateral; Future    Other orders  -     Tdap vaccine,  6yo or older,  IM        No Follow-up on file.       Anticipatory Guidance and Symptomatic Cares Discussed   Advised to call back directly if there are further questions, or if these symptoms fail to improve as anticipated or worsen.  Return to clinic if patient has a clinical concern that warrants an exam.        30  Min Total Time, > 50% counseling and coordination of Care    Mahendra Arceo MD  Family Medicine   [facility] [id number] [phone]    "

## 2021-06-16 PROBLEM — Z00.00 WELL ADULT EXAM: Status: ACTIVE | Noted: 2017-09-21

## 2021-06-16 PROBLEM — F90.9 ATTENTION DEFICIT HYPERACTIVITY DISORDER (ADHD), UNSPECIFIED ADHD TYPE: Status: ACTIVE | Noted: 2020-07-14

## 2021-06-16 PROBLEM — E66.812 CLASS 2 OBESITY WITH BODY MASS INDEX (BMI) OF 38.0 TO 38.9 IN ADULT, UNSPECIFIED OBESITY TYPE, UNSPECIFIED WHETHER SERIOUS COMORBIDITY PRESENT: Status: ACTIVE | Noted: 2020-07-14

## 2021-06-16 NOTE — TELEPHONE ENCOUNTER
Controlled Substance Refill Request  Medication Name:   Requested Prescriptions     Pending Prescriptions Disp Refills     dextroamphetamine-amphetamine (ADDERALL XR) 15 MG 24 hr capsule 90 capsule 0     Sig: Take 1 capsule (15 mg total) by mouth daily.     Date Last Fill: 11/6/20  Requested Pharmacy: [facility]  Submit electronically to pharmacy  Controlled Substance Agreement on file:   Encounter-Level CSA Scan Date:    There are no encounter-level csa scan date.        Last office visit:  7/14/20

## 2021-06-16 NOTE — PROGRESS NOTES
"Yamilet Christensen is a 45 y.o. female who is being evaluated via a billable video visit.      How would you like to obtain your AVS? MyChart.  If dropped from the video visit, the video invitation should be resent by: Text to cell phone: [phone]   Will anyone else be joining your video visit? No      Video Start Time:     Start: 04/12/2021 07:22 am  Stop: 04/12/2021 07:30 am    Subjective   Yamilet Christensen is 45 y.o. and presents today for the following health issues   HPI     Problem List Items Addressed This Visit     Attention deficit hyperactivity disorder (ADHD), unspecified ADHD type     Really great \"  Meds  Tie >> helps so do not binge eat  Increased focus\"    Down sides   \"have not found any\"  Sometimes I forget    \"can't take past 10Am >> won't sleep   12 hours     Heart Palpitation  No   GI No   No Sweats       Covered?  only name brand            Other Visit Diagnoses     Encounter for hepatitis C screening test for low risk patient    -  Primary    Relevant Orders    Hepatitis C Antibody (Anti-HCV)            Review of Systems  see note       Objective       Vitals:  No vitals were obtained today due to virtual visit.    Physical Exam  Full range of affect              Video-Visit Details    Type of service:  Video Visit    Video End Time (time video stopped): 7:32 AM  Originating Location (pt. Location): Home    Distant Location (provider location):  [facility]     Platform used for Video Visit: Aide    "

## 2021-06-16 NOTE — PATIENT INSTRUCTIONS - HE
Doing well    Continue present medications    Follow-up in 6 months    Medications working well to maintain focus      Be mindful of taking the medications later as this has been causing some insomnia    Rebecca

## 2021-06-20 NOTE — LETTER
Letter by Mahendra Areco MD at      Author: Mahendra Arceo MD Service: -- Author Type: --    Filed:  Encounter Date: 7/14/2020 Status: (Other)         [facility]/OB   07/14/20    Patient: Yamilet Christensen  YOB: 1975  Medical Record Number: [id number]                                                                  Opioid / Opioid Plus Controlled Substance Agreement    I understand that my care provider has prescribed an opioid (narcotic) controlled substance to help manage my condition(s). I am taking this medicine to help me function or work. I know this is strong medicine, and that it can cause serious side effects. Opioid medicine can be sedating, addicting and may cause a dependency on the drug. They can affect my ability to drive or think, and cause depression. They need to be taken exactly as prescribed. Combining opioids with certain medicines or chemicals (such as cocaine, sedatives and tranquilizers, sleeping pills, meth) can be dangerous or even fatal. Also, if I stop opioids suddenly, I may have severe withdrawal symptoms. Last, I understand that opioids do not work for all types of pain nor for all patients. If not helpful, I may be asked to stop them.        The risks, benefits, and side effects of these medicine(s) were explained to me. I agree that:    1. I will take part in other treatments as advised by my care team. This may be psychiatry or counseling, physical therapy, behavioral therapy, group treatment or a referral to a pain clinic. I will reduce or stop my medicine when my care team tells me to do so.  2. I will take my medicines as prescribed. I will not change the dose or schedule unless my care team tells me to. There will be no refills if I run out early.  I may be contactedwithout warning and asked to complete a urine drug test or pill count at any time.   3. I will keep all my appointments, and understand this is part of the monitoring of opioids. My care  team may require an office visit for EVERY opioid/controlled substance refill. If I miss appointments or dont follow instructions, my care team may stop my medicine.  4. I will not ask other providers to prescribe controlled substances, and I will not accept controlled substances from other people. If I need another prescribed controlled substance for a new reason, I will tell my care team within 1 business day.  5. I will use one pharmacy to fill all of my controlled substance prescriptions, and it is up to me to make sure that I do not run out of my medicines on weekends or holidays. If my care team is willing to refill my opioid prescription without a visit, I must request refills only during office hours, refills may take up to 3 days to process, and it may take up to 5 to 7 days for my medicine to be mailed and ready at my pharmacy. Prescriptions will not be mailed anywhere except my pharmacy.        [id number]  Rev 12/18         Registration to scan to EHR                             Page 1 of 2               Controlled Substance Agreement Opioid        [facility]/OB   07/14/20  Patient: Yamilet Christensen  YOB: 1975  Medical Record Number: [id number]                                                                  6. I am responsible for my prescriptions. If the medicine/prescription is lost or stolen, it will not be replaced. I also agree not to share controlled substance medicines with anyone.  7. I agree to not use ANY illegal or recreational drugs. This includes marijuana, cocaine, bath salts or other drugs. I agree not to use alcohol unless my care team says I may.          I agree to give urine samples whenever asked. If I dont give a urine sample, the care team may stop my medicine.    8. If I enroll in the [place] Medical Marijuana program, I will tell my care team. I will also sign an agreement to share my medical records with my care team.   9. I will bring in my list of  medicines (or my medicine bottles) each time I come to the clinic.   10. I will tell my care team right away if I become pregnant or have a new medical problem treated outside of my regular clinic.  11. I understand that this medicine can affect my thinking and judgment. It may be unsafe for me to drive, use machinery and do dangerous tasks. I will not do any of these things until I know how the medicine affects me. If my dose changes, I will wait to see how it affects me. I will contact my care team if I have concerns about medicine side effects.    I understand that if I do not follow any of the conditions above, my prescriptions or treatment may be stopped.      I agree that my provider, clinic care team, and pharmacy may work with any city, state or federal law enforcement agency that investigates the misuse, sale, or other diversion of my controlled medicine. I will allow my provider to discuss my care with or share a copy of this agreement with any other treating provider, pharmacy or emergency room where I receive care. I agree to give up (waive) any right of privacy or confidentiality with respect to these consents.     I have read this agreement and have asked questions about anything I did not understand.      ________________________________________________________________________  Patient signature - Date/Time -  Yamilet Christensen                                      ________________________________________________________________________  Witness signature                                                            ________________________________________________________________________  Provider signature - Mahendra Arceo MD      [id number]  Rev 12/18         Registration to scan to EHR                         Page 2 of 2                   Controlled Substance Agreement Opioid           Page 1 of 2  Opioid Pain Medicines (also known as Narcotics)  What You Need to Know    What are opioids?   Opioids are  pain medicines that must be prescribed by a doctor.  They are also known as narcotics.    Examples are:     morphine (MS Contin, Federica)    oxycodone (Oxycontin)    oxycodone and acetaminophen (Percocet)    hydrocodone and acetaminophen (Vicodin, Norco)     fentanyl patch (Duragesic)     hydromorphone (Dilaudid)     methadone     What do opioids do well?   Opioids are best for short-term pain after a surgery or injury. They also work well for cancer pain. Unlike other pain medicines, they do not cause liver or kidney failure or ulcers. They may help some people with long-lasting (chronic) pain.     What do opioids NOT do well?   Opioids never get rid of pain entirely, and they do not work well for most patients with chronic pain. Opioids do not reduce swelling, one of the causes of pain. They also dont work well for nerve pain.                           For informational purposes only.  Not to replace the advice of your care provider.  Copyright 201 [employer]. All right reserved. [employer] 352889-Omt 02/18.      Page 2 of 2    Risks and side effects   Talk to your doctor before you start or decide to keep taking one of these medicines. Side effects include:    Lowering your breathing rate enough to cause death    Overdose, including death, especially if taking higher than prescribed doses    Long-term opioid use    Worse depression symptoms; less pleasure in things you usually enjoy    Feeling tired or sluggish    Slower thoughts or cloudy thinking    Being more sensitive to pain over time; pain is harder to control    Trouble sleeping or restless sleep    Changes in hormone levels (for example, less testosterone)    Changes in sex drive or ability to have sex    Constipation    Unsafe driving    Itching and sweating    Feeling dizzy    Nausea, vomiting and dry mouth    What else should I know about opioids?  When someone takes opioids for too long or too often, they become dependent. This means that  if you stop or reduce the medicine too quickly, you will have withdrawal symptoms.    Dependence is not the same as addiction. Addiction is when people keep using a substance that harms their body, their mind or their relations with others. If you have a history of drug or alcohol abuse, taking opioids can cause a relapse.    Over time, opioids dont work as well. Most people will need higher and higher doses. The higher the dose, the more serious the side effects. We dont know the long-term effects of opioids.      Prescribed opioids aren't the best way to manage chronic pain    Other ways to manage pain include:      Ibuprofen or acetaminophen.  You should always try this first.      Treat health problems that may be causing pain.      acupuncture or massage, deep breathing, meditation, visual imagery, aromatherapy.      Use heat or ice at the pain site      Physical therapy and exercise      Stop smoking      See a counselor or therapist                                                  People who have used opioids for a long time may have a lower quality of life, worse depression, higher levels of pain and more visits to doctors.    Never share your opioids with others. Be sure to store opioids in a secure place, locked if possible.Young children can easily swallow them and overdose.     You can overdose on opioids.  Signs of overdose include decrease or loss of consciousness, slowed breathing, trouble waking and blue lips.  If someone is worried about overdose, they should call 911.    If you are at risk for overdose, you may get naloxone (Narcan, a medicine that reverses the effects of opioids.  If you overdose, a friend or family member can give you Narcan while waiting for the ambulance.  They need to know the signs of overdose and how to give Narcan.    While you're taking opioids:    Don't use alcohol or street drugs. Taking them together can cause death.    Don't take any of these medicines unless your  doctor says its okay.  Taking these with opioids can cause death.    Benzodiazepines (such as lorazepam         or diazepam)    Muscle relaxers (such as cyclobenzaprine)    sleeping pills    other opioids    Safe disposal of opioids  Find your area drug take-back program, your pharmacy mail-back program, buy a special disposal bag (such as Deterra) from your pharmacy or flush them down the toilet.  Use the guidelines at:  www.fda.gov/drugs/resourcesforyou

## 2021-06-23 NOTE — PROGRESS NOTES
"ASSESSMENT & PLAN    No problem-specific Assessment & Plan notes found for this encounter.      Yamilet was seen today for annual exam.    Diagnoses and all orders for this visit:    Well adult exam  -     LipoFit by NMR  -     Dehydroepiandrosterone Sulfate, Serum (DHEAS)  -     Comprehensive Metabolic Panel  -     HM1(CBC and Differential)  -     C -Reactive Protein, High Sensitivity  -     Vitamin D, Total (25-Hydroxy)  -     Vitamin B12  -     Thyroid Cascade  -     Thyroid Peroxidase Antibody  -     Microalbumin, Random Urine  -     Insulin Assay  -     HM1 (CBC with Diff)    Premenstrual Disorder    Class 2 obesity with body mass index (BMI) of 37.0 to 37.9 in adult, unspecified obesity type, unspecified whether serious comorbidity present  -     LipoFit by NMR  -     Thyroid Cascade  -     Thyroid Peroxidase Antibody  -     Microalbumin, Random Urine  -     Insulin Assay    Anxiety        Patient Instructions   Focused Nutrition:  Eat Clean and Whole Foods  These are single ingredient foods that possess vital nutrients which can touch and affect our health in a positive manner.            Lean Meats Protein and Fat---- Nuts, Eggs,Veggies, Fish and Lean Meats especially fish and poultry. Meat and Eggs in their natural form have No Sugar.  Try to choose good fat from Fish, Nuts, Avocados, Extra Virgin Olive Oil (not cooked) other vegetables for example. Opt for Plant Proteins and Fats over Animal Fats      For High Protein Eat---- Meats, Fish, Lean Meats, Beans, Nuts, and Quinoa/ other Whole grains    Focus on \"Whole Foods\":  You know what the food is by looking at it and comes from a plant, tree, animal or the sea.  Single source organically grown if possible.    Look for Low Glycemic foods:  Try to Avoid foods with any added sugars and absolutely  no High Fructose Corn Syrup    Lean Meats Protein and Fat: These in their natural form have No Sugar.  Go for the good fat from Fish, Nuts, Avocados, Extra Virgin " Olive Oil (not cooked) other vegetables for example. Opt for Plant Proteins and Fats over Animal Fats    Beans are excellent complex carbohydrates with fiber- black, garbanzo, lentil, kidney, lima, Richard, Underwood    Simple flours and breads, sigh,  in general are simple sugars, when processed and should be avoided.  However there are beneficial fibers in wholes grains.  So if choosing, go for the 100% whole-grain Grains --Bran, brown rice, Flax----Fiber offsets glycemic affect    Vegetables: most vegetables are low glycemic with the exception of starchy ones:  potatoes, carrots, corn, and beats    Whole Fruits mostly are low glycemic:  try to eat his snacks: Try not to pair with fat.  Insulin burns sugar and stores fat as a job     High glycemic fruits: Dates, Watermelon, Figgs, Apricots, Raisins but in moderation OK    For Snacks go for nuts and seeds unless you are instructed not to do so due to another health condition; please although tempting avoid corn chips, jellybeans, pretzels other processed snacks that usually pair sugar and fat.      Probiotics and Prebiotics support digestion and our immune system!    Foods that support this are, among others:    Pros: Kefir, Kombucha, Miso, Pickled Vegetables, Matt Kraut, Yogurt, Tempeh,     Pre: Asparagus, Bananas, Eggplant, Garlic, Honey, Kefir, Leeks, Legumes, Onions, Peas, Whole Grains, and Yogurt    Nutrients support our cellular machinery:  High nutrient food support this.    Rest helps digest.  We need restorative sleep  8- 10 hours.  We should also try to have 10 to 12 hours at some point between meals, e.g. 7 PM to 7 AM     Fiber: Lino Seed or Flax Seed or Hemp Seeds:  2-3 tablespoons daily for fiber and Omega 3s      Move everyday your body and sweat!    Get good 8 to 10 hours of Sleep and Hydrate with Water      If your Triglycerides are High:    Look into a Ketogenic Diet    Always choose --No added Sugar..    Fish Oil 2000 mg Daily and/or Flax or Lino seeds   "Milled 2 tablespoons                            Daily    Lino seed in [place] Milk over night to make pudding    Nuts Especially walnuts.    Fish especially  [place]kins, Mackerel, Anchovy,Sardine and Herring    Vegetables opt for multiple colors daily  New Goal 3- 5 cups of fruits and                         veggies Daily!!      Fermented Foods Rock!  You can do your own preserving and culturing of good bacteria!      Drink fermented Kombuchaa  Eat Cultured vegetable like Kimchi or Sauerkraut  Apple Cider Vinegar Unfiltered can be added 8 oz fizzy water  Foods:  Beets, Celery, Lemon, Lime, Grapefruit, Cucumber and Carrots    Use Bitter Herbs:  Irma, Arugala, Cilantro, Turmeric, Dandelion, CUmin, Fennel, Mint, Leeks, Parsley, and Milk THistle    Practice Fastin hours from last meal in evening to first meal in morming    Chlorophyll Rich Foods may help, add to water CHorella or Spirulina    Eat Kale and Broccoli Sprouts --- Sulfurophane rich      Eat Cruciferous Vegetables Daily:    Broccoli, Cauliflower, Kale, Collards, Brussel Sprouts    Eat Lots of Fiber:      Soluble:   Lino, Flax Hemp, Pumpkin Seeds  Insoluble:  Fruits and Veggies  Best sources of  Chicory Root Ground, Dandelion Root, Asparagus, Leeks and Onion, Bananas ( more green), Sprouted Wheat eg Med Bread , Garlic, [place] Artichokes,)       Lastly we have to think of things that are toxic to our health, which may contribute to poor health and disease.  An apple covered in pesticides has both healthy and toxic components for our system.  The very \"healthy choices\" may be laced with toxins.  So choose foods wisely and discriminatingly.      Here are some recommendations about when and when not to choose organic foods.  When in doubt wash!      The group identified the following items on its  Dirty Dozen  list of produce with the most pesticide residue: "   1. Strawberries  2. Spinach  3. Nectarines  4. Apples  5. Grapes  6. Peaches  7. Cherries  8. Pears  9. Tomatoes  10. Celery  11. Potatoes  12. Sweet Bell Peppers  Here are the items the EW identified for its  Clean 15,  which report the least likelihood to contain pesticide residue.  1. Avocados  2. Sweet Corn  3. Pineapples  4. Cabbages  5. Onions  6. Sweet Peas  7. Papayas  8. Asparagus  9. Mangoes  10. Eggplants  11. Honeydews  12. Kiwis  13. Cantaloupes  14. Cauliflower  15. Broccoli          Eat well, Get Good Sleep and Stay Active!    DanL    Stage 3 Hypo-Cortisol  Total Cortisol Low and DHEA is very low                                                Fatigue, Back and Joint Pain                                                Depression and Insomnia  Low Cortisol/Low DHEA/Low Estradiol and Low Progestrone  First Office Visit    5-HTP 75>>150>>300 mg Daily  Jannette 100>>200>>400 Daily  L-theanine 100>>200>>400 mg Daily  Inositol 1000>>2000 mg Daily  L-tyrosine  200>>60>>1200>>2000 mg Daily  Mucuna Pruriens 400>>800 mg Daily  EPA/DHA Fish Oil  2000 mg Daily  Second Office Visit  Pregnenolone 10 mg 1 tablet sublingual 3 times daily  DHEA 5 mg under tongue 3 times diily  If DHEAs Low   Licorice root extract 10 drops AM and 10 drops noon  Eleavtar  RHodiola  Schisandra  Ashwaganda 500 mg Three times daily  Melatonin  1-3 mg Target Bedtime            No Follow-up on file.       Little interest or pleasure in doing things: Not at all  Feeling down, depressed, or hopeless: Not at all    CHIEF COMPLAINT: Yamilet Christensen had concerns including Annual Exam (fasting).    [place]: 1.............. had concerns including Annual Exam (fasting).    1. Well adult exam    2. Premenstrual Disorder    3. Class 2 obesity with body mass index (BMI) of 37.0 to 37.9 in adult, unspecified obesity type, unspecified whether serious comorbidity present    4. Anxiety          CC:             Why are you here today?                            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 female    No past medical history on file.  Past Surgical History:   Procedure Laterality Date     WI REDUCTION OF LARGE BREAST      Description: Breast Surgery Reduction Procedure;  Recorded: 07/24/2008;     Sulfa (sulfonamide antibiotics)  No current outpatient medications on file.     No current facility-administered medications for this visit.      No family history on file.  Social History     Socioeconomic History     Marital status:      Spouse name: None     Number of children: None     Years of education: None     Highest education level: None   Social Needs     Financial resource strain: None     Food insecurity - worry: None     Food insecurity - inability: None     Transportation needs - medical: None     Transportation needs - non-medical: None   Occupational History     None   Tobacco Use     Smoking status: Never Smoker     Smokeless tobacco: Never Used   Substance and Sexual Activity     Alcohol use: None     Drug use: None     Sexual activity: None   Other Topics Concern     None   Social History Narrative     None     Patient Active Problem List   Diagnosis     Dysplastic Nevus     Premenstrual Disorder     Well adult exam                                              SOCIAL: She  reports that  has never smoked. she has never used smokeless tobacco.    REVIEW OF SYSTEMS:   Family history  "not pertinent to chief complaint or presenting problem    Review of Systems:      Nervous System:  No headache, paresthesia or dizziness or fainting                                  Ears: No hearing loss or ringing in the ears    Eyes: No blurring of vision, Double Vision            No redness, itching or dryness.    Nose: No nosebleed or loss of smell    Mouth: No mouth sores or  coated tongue    Throat: No hoarseness or difficulty swallowing    Neck: No enlarged thyroid or lymph nodes.    Heart: No chest pain, palpitation or irregular heartbeat.                  Lungs: No shortness of breath, wheezing or hemoptysis.    Gastrointestinal: No nausea or vomiting, melena or blood in stools.    Kidney/Bladdr: No polyuria, polydipsia, or hematuria.                             Genital/Sexual: No Sex function Changes                                Skin: No rash    Muscles/Joints/Bones: No Muscle morning stiffness, No Effusion of a Joint     Review of systems otherwise negative as requested from patient, except   Those positive ROS outlined and discussed in [place].    OBJECTIVE:  /84 (Patient Site: Right Arm, Patient Position: Sitting, Cuff Size: Adult Large)   Pulse 83   Ht 5' 3\" (1.6 m)   Wt 212 lb (96.2 kg)   LMP 01/05/2019   SpO2 99%   BMI 37.55 kg/m      GENERAL:     No acute distress.   Alert and oriented X 3         Physical:    Clear clear  Oropharynx is clear  Excellent dentition  TMs are clear  Thyroid pump nontender without nodules  Lungs are clear  Cardiac S1-S2 regular sinus appreciable murmur gallop  Abdomen soft flat nontender no appreciable organ enlargement  No lower extremity edema  No appreciable dysplastic nevi of the back        ASSESSMENT & PLAN      Yamilet was seen today for annual exam.    Diagnoses and all orders for this visit:    Well adult exam  -     LipoFit by NMR  -     Dehydroepiandrosterone Sulfate, Serum (DHEAS)  -     Comprehensive Metabolic Panel  -     HM1(CBC and " Differential)  -     C -Reactive Protein, High Sensitivity  -     Vitamin D, Total (25-Hydroxy)  -     Vitamin B12  -     Thyroid Cascade  -     Thyroid Peroxidase Antibody  -     Microalbumin, Random Urine  -     Insulin Assay  -     HM1 (CBC with Diff)    Premenstrual Disorder    Class 2 obesity with body mass index (BMI) of 37.0 to 37.9 in adult, unspecified obesity type, unspecified whether serious comorbidity present  -     LipoFit by NMR  -     Thyroid Cascade  -     Thyroid Peroxidase Antibody  -     Microalbumin, Random Urine  -     Insulin Assay    Anxiety        No Follow-up on file.       Anticipatory Guidance and Symptomatic Cares Discussed   Advised to call back directly if there are further questions, or if these symptoms fail to improve as anticipated or worsen.  Return to clinic if patient has a clinical concern that warrants an exam.         I spent 30  minutes with this patient face to face, of which 50% or greater was spent in counseling and coordination of care with regards to Yamilet was seen today for annual exam.    Diagnoses and all orders for this visit:    Well adult exam  -     LipoFit by NMR  -     Dehydroepiandrosterone Sulfate, Serum (DHEAS)  -     Comprehensive Metabolic Panel  -     HM1(CBC and Differential)  -     C -Reactive Protein, High Sensitivity  -     Vitamin D, Total (25-Hydroxy)  -     Vitamin B12  -     Thyroid Cascade  -     Thyroid Peroxidase Antibody  -     Microalbumin, Random Urine  -     Insulin Assay  -     HM1 (CBC with Diff)    Premenstrual Disorder    Class 2 obesity with body mass index (BMI) of 37.0 to 37.9 in adult, unspecified obesity type, unspecified whether serious comorbidity present  -     LipoFit by NMR  -     Thyroid Cascade  -     Thyroid Peroxidase Antibody  -     Microalbumin, Random Urine  -     Insulin Assay    Anxiety        Mahendra Arceo MD  [facility] [place] [address] [phone]

## 2021-06-23 NOTE — PATIENT INSTRUCTIONS - HE
"Focused Nutrition:  Eat Clean and Whole Foods  These are single ingredient foods that possess vital nutrients which can touch and affect our health in a positive manner.            Lean Meats Protein and Fat---- Nuts, Eggs,Veggies, Fish and Lean Meats especially fish and poultry. Meat and Eggs in their natural form have No Sugar.  Try to choose good fat from Fish, Nuts, Avocados, Extra Virgin Olive Oil (not cooked) other vegetables for example. Opt for Plant Proteins and Fats over Animal Fats      For High Protein Eat---- Meats, Fish, Lean Meats, Beans, Nuts, and Quinoa/ other Whole grains    Focus on \"Whole Foods\":  You know what the food is by looking at it and comes from a plant, tree, animal or the sea.  Single source organically grown if possible.    Look for Low Glycemic foods:  Try to Avoid foods with any added sugars and absolutely  no High Fructose Corn Syrup    Lean Meats Protein and Fat: These in their natural form have No Sugar.  Go for the good fat from Fish, Nuts, Avocados, Extra Virgin Olive Oil (not cooked) other vegetables for example. Opt for Plant Proteins and Fats over Animal Fats    Beans are excellent complex carbohydrates with fiber- black, garbanzo, lentil, kidney, lima, Richard, Underwood    Simple flours and breads, sigh,  in general are simple sugars, when processed and should be avoided.  However there are beneficial fibers in wholes grains.  So if choosing, go for the 100% whole-grain Grains --Bran, brown rice, Flax----Fiber offsets glycemic affect    Vegetables: most vegetables are low glycemic with the exception of starchy ones:  potatoes, carrots, corn, and beats    Whole Fruits mostly are low glycemic:  try to eat his snacks: Try not to pair with fat.  Insulin burns sugar and stores fat as a job     High glycemic fruits: Dates, Watermelon, Figgs, Apricots, Raisins but in moderation OK    For Snacks go for nuts and seeds unless you are instructed not to do so due to another health condition; " please although tempting avoid corn chips, jellybeans, pretzels other processed snacks that usually pair sugar and fat.      Probiotics and Prebiotics support digestion and our immune system!    Foods that support this are, among others:    Pros: Kefir, Kombucha, Miso, Pickled Vegetables, Matt Kraut, Yogurt, Tempeh,     Pre: Asparagus, Bananas, Eggplant, Garlic, Honey, Kefir, Leeks, Legumes, Onions, Peas, Whole Grains, and Yogurt    Nutrients support our cellular machinery:  High nutrient food support this.    Rest helps digest.  We need restorative sleep  8- 10 hours.  We should also try to have 10 to 12 hours at some point between meals, e.g. 7 PM to 7 AM     Fiber: Lino Seed or Flax Seed or Hemp Seeds:  2-3 tablespoons daily for fiber and Omega 3s      Move everyday your body and sweat!    Get good 8 to 10 hours of Sleep and Hydrate with Water      If your Triglycerides are High:    Look into a Ketogenic Diet    Always choose --No added Sugar..    Fish Oil 2000 mg Daily and/or Flax or Lino seeds  Milled 2 tablespoons                            Daily    Lino seed in [place] Milk over night to make pudding    Nuts Especially walnuts.    Fish especially  [place], Mackerel, Anchovy,Sardine and Herring    Vegetables opt for multiple colors daily  New Goal 3- 5 cups of fruits and                         veggies Daily!!      Fermented Foods Rock!  You can do your own preserving and culturing of good bacteria!      Drink fermented Kombuchaa  Eat Cultured vegetable like Kimchi or Sauerkraut  Apple Cider Vinegar Unfiltered can be added 8 oz fizzy water  Foods:  Beets, Celery, Lemon, Lime, Grapefruit, Cucumber and Carrots    Use Bitter Herbs:  Irma, Arugala, Cilantro, Turmeric, Dandelion, CUmin, Fennel, Mint, Leeks, Parsley, and Milk THistle    Practice Fastin hours from last meal in evening to first meal in morming    Chlorophyll Rich Foods may help, add to water CHorella or Spirulina    Eat Kale and Broccoli  "Sprouts --- Sulfurophane rich      Eat Cruciferous Vegetables Daily:    Broccoli, Cauliflower, Kale, Collards, Brussel Sprouts    Eat Lots of Fiber:      Soluble:   Lino, Flax Hemp, Pumpkin Seeds  Insoluble:  Fruits and Veggies  Best sources of  Chicory Root Ground, Dandelion Root, Asparagus, Leeks and Onion, Bananas ( more green), Sprouted Wheat eg Med Bread , Garlic, [place] Artichokes,)       Lastly we have to think of things that are toxic to our health, which may contribute to poor health and disease.  An apple covered in pesticides has both healthy and toxic components for our system.  The very \"healthy choices\" may be laced with toxins.  So choose foods wisely and discriminatingly.      Here are some recommendations about when and when not to choose organic foods.  When in doubt wash!      The group identified the following items on its  Dirty Dozen  list of produce with the most pesticide residue:   1. Strawberries  2. Spinach  3. Nectarines  4. Apples  5. Grapes  6. Peaches  7. Cherries  8. Pears  9. Tomatoes  10. Celery  11. Potatoes  12. Sweet Bell Peppers  Here are the items the EWG identified for its  Clean 15,  which report the least likelihood to contain pesticide residue.  1. Avocados  2. Sweet Corn  3. Pineapples  4. Cabbages  5. Onions  6. Sweet Peas  7. Papayas  8. Asparagus  9. Mangoes  10. Eggplants  11. Honeydews  12. Kiwis  13. Cantaloupes  14. Cauliflower  15. Broccoli          Eat well, Get Good Sleep and Stay Active!    DanL    Stage 3 Hypo-Cortisol  Total Cortisol Low and DHEA is very low                                                Fatigue, Back and Joint Pain                                                Depression and Insomnia  Low Cortisol/Low DHEA/Low Estradiol and Low Progestrone  First Office Visit    5-HTP 75>>150>>300 mg Daily  Jannette 100>>200>>400 Daily  L-theanine 100>>200>>400 mg Daily  Inositol 1000>>2000 mg Daily  L-tyrosine  200>>60>>1200>>2000 mg Daily  Mucuna " Pruriens 400>>800 mg Daily  EPA/DHA Fish Oil  2000 mg Daily  Second Office Visit  Pregnenolone 10 mg 1 tablet sublingual 3 times daily  DHEA 5 mg under tongue 3 times diily  If DHEAs Low   Licorice root extract 10 drops AM and 10 drops noon  Eleuthero  RHodiola  Schisandra  Ashwaganda 500 mg Three times daily  Melatonin  1-3 mg Target Bedtime      Measures to support barrier function of the Gut Health and Function    Consider using lactulose/mannitol absorption test to measure in vivo intestinal permeability as a baseline  (especially in subjects previously treated, but untested, for intestinal barrier issues).    Retest using lactulose/mannitol absorption test periodically to make adjustments to therapies.    Discover and avoid foods known to cause increased intestinal permeability:      These may include: Gluten, dairy/lactose, Capsicum, spicy food, FODMAPs. Soy , Corn and Eliminate Added Sugars in general      Test for (and avoid) food allergens (IgE/mast cell stimulation)    Discontinue nonsteroidal anti-inflammatory agents if possible except prescribed have a risk benefit discussion    Assess HPA Axis (adrenal)  stressors and treat accordingly.  Stressed directly influences gut permeability.    Adaptogens  Ashwaganda, Phosphatidyl Serine, Rhodiola,     Supplements for Adrenal Support    Stress Response Botanicals  Ashwaganda  Eulethero  Rhodiola ([place] Ginseng)  Schsandra  Holy Basil  Codyceps   Bacopa  Hops  Passionflower  Mucuna  Chamomile  Valerian    Avoid strenuous physical activity/exercise or pay special attention to supporting got an immune health before and after such activities.  Moderate exercise is helpful.    Avoid processed foods with artificial colors and flavors.    Eat abundant amounts of fresh fruits and vegetables to maximize the amount and diversity of vital nutrients.    Consider the following nutrients for supplementation:    Omega-3 fatty acids, ALA, EPA, DHA ( through diet and  supplementation) 2000 - 3000 mg daily      Vitamin D 1-5000 international units daily with fatty meal, best to test and dose to desired levels shoot for a level of 50-60    Probiotics (mixed drain combination 20-40 billion CFU, consider high doses for long-standing intestinal barrier issues are when associated with inflammatory bowel disease)    Prebiotics (see precursors for important short-chain fatty acids [may be contraindicated if FODMAPS are to be avoided])    Zinc 25 mg daily/copper 1 mg daily    Iron (only when iron deficiency is confirmed)    Flavonoids ( for quercetin and related compounds, dose not as Important as consistent daily consumption from foods and supplementation    Colostrum/lactoferrin/IgG Da to see consider using combination [usually a concentrated    Berberine 1 g daily when subject is obese, insulin resistant or has type 2 diabetes

## 2021-06-25 ENCOUNTER — RECORDS - HEALTHEAST (OUTPATIENT)
Dept: ADMINISTRATIVE | Facility: OTHER | Age: 46
End: 2021-06-25

## 2021-07-10 ENCOUNTER — TRANSFERRED RECORDS (OUTPATIENT)
Dept: HEALTH INFORMATION MANAGEMENT | Facility: CLINIC | Age: 46
End: 2021-07-10

## 2021-07-10 ENCOUNTER — HEALTH MAINTENANCE LETTER (OUTPATIENT)
Age: 46
End: 2021-07-10

## 2021-09-04 ENCOUNTER — HEALTH MAINTENANCE LETTER (OUTPATIENT)
Age: 46
End: 2021-09-04

## 2021-10-30 ENCOUNTER — HEALTH MAINTENANCE LETTER (OUTPATIENT)
Age: 46
End: 2021-10-30

## 2022-05-24 ASSESSMENT — ENCOUNTER SYMPTOMS
ABDOMINAL PAIN: 0
DIZZINESS: 0
NERVOUS/ANXIOUS: 0
HEMATURIA: 0
SHORTNESS OF BREATH: 0
ARTHRALGIAS: 0
NAUSEA: 0
HEADACHES: 0
PALPITATIONS: 0
FEVER: 0
BREAST MASS: 0
HEARTBURN: 1
FREQUENCY: 0
DYSURIA: 0
SORE THROAT: 0
DIARRHEA: 0
MYALGIAS: 0
COUGH: 0
CONSTIPATION: 0
EYE PAIN: 0
CHILLS: 0
HEMATOCHEZIA: 0
WEAKNESS: 0
JOINT SWELLING: 0
PARESTHESIAS: 0

## 2022-05-24 ASSESSMENT — PATIENT HEALTH QUESTIONNAIRE - PHQ9
SUM OF ALL RESPONSES TO PHQ QUESTIONS 1-9: 12
SUM OF ALL RESPONSES TO PHQ QUESTIONS 1-9: 12
10. IF YOU CHECKED OFF ANY PROBLEMS, HOW DIFFICULT HAVE THESE PROBLEMS MADE IT FOR YOU TO DO YOUR WORK, TAKE CARE OF THINGS AT HOME, OR GET ALONG WITH OTHER PEOPLE: SOMEWHAT DIFFICULT

## 2022-05-27 ENCOUNTER — OFFICE VISIT (OUTPATIENT)
Dept: FAMILY MEDICINE | Facility: CLINIC | Age: 47
End: 2022-05-27
Payer: COMMERCIAL

## 2022-05-27 VITALS
WEIGHT: 210 LBS | DIASTOLIC BLOOD PRESSURE: 70 MMHG | BODY MASS INDEX: 35.85 KG/M2 | OXYGEN SATURATION: 98 % | SYSTOLIC BLOOD PRESSURE: 122 MMHG | HEART RATE: 105 BPM | HEIGHT: 64 IN

## 2022-05-27 DIAGNOSIS — N92.0 MENORRHAGIA WITH REGULAR CYCLE: ICD-10-CM

## 2022-05-27 DIAGNOSIS — K64.4 ANAL SKIN TAG: ICD-10-CM

## 2022-05-27 DIAGNOSIS — Z12.4 CERVICAL CANCER SCREENING: ICD-10-CM

## 2022-05-27 DIAGNOSIS — F32.81 PMDD (PREMENSTRUAL DYSPHORIC DISORDER): Primary | ICD-10-CM

## 2022-05-27 DIAGNOSIS — Z13.220 SCREENING FOR HYPERLIPIDEMIA: ICD-10-CM

## 2022-05-27 DIAGNOSIS — Z20.9 EXPOSURE TO POTENTIAL INFECTION: ICD-10-CM

## 2022-05-27 DIAGNOSIS — R92.30 DENSE BREAST: ICD-10-CM

## 2022-05-27 DIAGNOSIS — Z12.31 VISIT FOR SCREENING MAMMOGRAM: ICD-10-CM

## 2022-05-27 DIAGNOSIS — Z11.59 NEED FOR HEPATITIS C SCREENING TEST: ICD-10-CM

## 2022-05-27 DIAGNOSIS — Z12.11 SCREEN FOR COLON CANCER: ICD-10-CM

## 2022-05-27 LAB
C REACTIVE PROTEIN LHE: 0.8 MG/DL (ref 0–0.8)
TSH SERPL DL<=0.005 MIU/L-ACNC: 1.14 UIU/ML (ref 0.3–5)

## 2022-05-27 PROCEDURE — G0123 SCREEN CERV/VAG THIN LAYER: HCPCS | Performed by: FAMILY MEDICINE

## 2022-05-27 PROCEDURE — 86140 C-REACTIVE PROTEIN: CPT | Performed by: FAMILY MEDICINE

## 2022-05-27 PROCEDURE — 86481 TB AG RESPONSE T-CELL SUSP: CPT | Performed by: FAMILY MEDICINE

## 2022-05-27 PROCEDURE — 36415 COLL VENOUS BLD VENIPUNCTURE: CPT | Performed by: FAMILY MEDICINE

## 2022-05-27 PROCEDURE — 87624 HPV HI-RISK TYP POOLED RSLT: CPT | Performed by: FAMILY MEDICINE

## 2022-05-27 PROCEDURE — 99396 PREV VISIT EST AGE 40-64: CPT | Performed by: FAMILY MEDICINE

## 2022-05-27 PROCEDURE — 84443 ASSAY THYROID STIM HORMONE: CPT | Performed by: FAMILY MEDICINE

## 2022-05-27 ASSESSMENT — ENCOUNTER SYMPTOMS
CONSTIPATION: 0
HEADACHES: 0
PARESTHESIAS: 0
FEVER: 0
SHORTNESS OF BREATH: 0
BREAST MASS: 0
NERVOUS/ANXIOUS: 0
WEAKNESS: 0
DIARRHEA: 0
COUGH: 0
MYALGIAS: 0
ABDOMINAL PAIN: 0
FREQUENCY: 0
DIZZINESS: 0
NAUSEA: 0
JOINT SWELLING: 0
SORE THROAT: 0
DYSURIA: 0
HEARTBURN: 1
CHILLS: 0
PALPITATIONS: 0
HEMATURIA: 0
EYE PAIN: 0
ARTHRALGIAS: 0
HEMATOCHEZIA: 0

## 2022-05-27 NOTE — PATIENT INSTRUCTIONS
Thanks for coming in Yamilet    Try sertraline 50 mg starting at either 25 mg daily or 50 mg daily starting 2 days after ovulation 3 years.  Or take continuously    I would like you to see Dr. Courtney Tapia MD    Undergraduate School  [employer] [facility] [employer] [facility] 2004    Medical School  [place], [place] 2011    Surgery Training  [place], [place] 2017    Colon and Rectal Surgery Training  [place], [place] 2018    Professional Memberships  American College of Surgeons    American Medical Association    American Society of Colon and Rectal Surgeons    [place] Society    [place] Surgical Society         Board Certification  American Board of Colon and Rectal Surgery    American Board of Surgery     Special Interests  Minimally Invasive Colorectal Surgery, Colon and Rectal Cancer, Anorectal Disease, Inflammatory Bowel Disease.     Primary Hospital Affiliations  [facility]

## 2022-05-27 NOTE — ASSESSMENT & PLAN NOTE
"Every third period heavy   \"gusher\"    Spotting No   Just Heavy     More clots     Plan     Mirena  "

## 2022-05-27 NOTE — ASSESSMENT & PLAN NOTE
"Last period   \"nailed\"  All wanted to do is sleep  Work \"exhausting\"    Just ffeels  energy   Feels super tired    Ongoing   ? Seasonal     "

## 2022-05-27 NOTE — PROGRESS NOTES
SUBJECTIVE:   CC: Yamilet Christensen is an 46 year old woman who presents for preventive health visit.         Healthy Habits:     Getting at least 3 servings of Calcium per day:  Yes    Bi-annual eye exam:  Yes    Dental care twice a year:  Yes    Sleep apnea or symptoms of sleep apnea:  None    Diet:  Regular (no restrictions)    Frequency of exercise:  None    Taking medications regularly:  Yes    Medication side effects:  Not applicable    PHQ-2 Total Score: 4    Additional concerns today:  No        Today's PHQ-2 Score:   PHQ-2 ( 1999 [employer]) 5/24/2022   Q1: Little interest or pleasure in doing things 3   Q2: Feeling down, depressed or hopeless 1   PHQ-2 Score 4   Q1: Little interest or pleasure in doing things Nearly every day   Q2: Feeling down, depressed or hopeless Several days   PHQ-2 Score 4       Abuse: Current or Past (Physical, Sexual or Emotional) - No  Do you feel safe in your environment? Yes    Have you ever done Advance Care Planning? (For example, a Health Directive, POLST, or a discussion with a medical provider or your loved ones about your wishes): Yes, patient states has an Advance Care Planning document and will bring a copy to the clinic.    Social History     Tobacco Use     Smoking status: Never Smoker     Smokeless tobacco: Never Used   Substance Use Topics     Alcohol use: Yes         Alcohol Use 5/24/2022   Prescreen: >3 drinks/day or >7 drinks/week? No       Reviewed orders with patient.  Reviewed health maintenance and updated orders accordingly -     Breast Cancer Screening:every year (prior Breast reduction) Dense Breast     Breast CA Risk Assessment (FHS-7) 5/24/2022   Do you have a family history of breast, colon, or ovarian cancer? No / Unknown         Pertinent mammograms are reviewed under the imaging tab.    History of abnormal Pap smear: NO - age 30-65 PAP every 5 years with negative HPV co-testing recommended  PAP / HPV 9/21/2017   PAP Negative for squamous intraepithelial  "lesion or malignancy  Electronically signed by Kyara Swan CT ([facility]) on 10/2/2017 at  9:22 AM       Reviewed and updated as needed this visit by clinical staff   Tobacco  Allergies  Meds                Reviewed and updated as needed this visit by Provider                   No past medical history on file.   Past Surgical History:   Procedure Laterality Date     HC REDUCTION OF LARGE BREAST      Description: Breast Surgery Reduction Procedure;  Recorded: 07/24/2008;     MAMMOPLASTY REDUCTION Bilateral 2005       Review of Systems   Constitutional: Negative for chills and fever.   HENT: Negative for congestion, ear pain, hearing loss and sore throat.    Eyes: Negative for pain and visual disturbance.   Respiratory: Negative for cough and shortness of breath.    Cardiovascular: Negative for chest pain, palpitations and peripheral edema.   Gastrointestinal: Positive for heartburn. Negative for abdominal pain, constipation, diarrhea, hematochezia and nausea.   Breasts:  Negative for tenderness, breast mass and discharge.   Genitourinary: Negative for dysuria, frequency, genital sores, hematuria, pelvic pain, urgency, vaginal bleeding and vaginal discharge.   Musculoskeletal: Negative for arthralgias, joint swelling and myalgias.   Skin: Negative for rash.   Neurological: Negative for dizziness, weakness, headaches and paresthesias.   Psychiatric/Behavioral: Positive for mood changes. The patient is not nervous/anxious.      PMDD Symptoms      OBJECTIVE:   /70   Pulse 105   Ht 1.619 m (5' 3.75\")   Wt 95.3 kg (210 lb)   SpO2 98%   BMI 36.33 kg/m    Physical Exam      Physical:  General Appearance: Healthy-appearingy.   Head:  No deformity  Eyes: Sclerae white, pupils equal and reactive, extra ocular movements intact   Ears: Well-positioned, well-formed pinnae; TM pearly white, translucent, no bulging   Nose: Clear, normal mucosa no drainage or crusting  Throat: Lips, tongue, and mucosa are moist, pink " and intact; tongue no thrush oral pharynx no injection or lesions  Neck: Supple, symmetric ROM no nodes   No carotid Buits  Chest: Lungs clear to auscultation, no retractions  Heart: Regular rate & rhythm, S1 S2, no murmur  Abdomen: Soft, non-tender, no masses; umbilical area normal   Pulses: Equal femoral pulses  : No hernia palpable cervix parous Normal Bimanual Anal Tags vs Papilloma   Extremities: Well-perfused, warm and dry, No Edema  Palpable Pulses Bilateral  Neuro: Easily aroused good tone NO tremor   Skin  No Rash    Recent Results (from the past 240 hour(s))   TSH with free T4 reflex    Collection Time: 05/27/22  4:31 PM   Result Value Ref Range    TSH 1.14 0.30 - 5.00 uIU/mL   CRP, inflammation    Collection Time: 05/27/22  4:31 PM   Result Value Ref Range    CRP 0.8 (H) 0.0 - 0.8 mg/dL         Diagnostic Test Results:  Labs reviewed in Epic    ASSESSMENT/PLAN:   Yamilet was seen today for physical.    Diagnoses and all orders for this visit:    PMDD (premenstrual dysphoric disorder)  -     sertraline (ZOLOFT) 50 MG tablet; Take 1 tablet (50 mg) by mouth daily  -     TSH with free T4 reflex; Future  -     TSH with free T4 reflex    Screen for colon cancer  -     Adult Gastro Ref - Procedure Only; Future  -     Colorectal Surgery Referral; Future    Need for hepatitis C screening test    Visit for screening mammogram  -     Cancel: MA SCREENING DIGITAL BILAT - Future  (s+30); Future  -     *MA Screening Digital Bilateral; Future    Screening for hyperlipidemia  -     Cancel: Lipid panel reflex to direct LDL Fasting; Future    Cervical cancer screening  -     Pap Screen with HPV - recommended age 30 - 65 years    Menorrhagia with regular cycle    Exposure to potential infection  -     Quantiferon TB Gold Plus; Future  -     CRP, inflammation; Future  -     Quantiferon TB Gold Plus  -     CRP, inflammation    Anal skin tag  -     Cancel: Colorectal Surgery Referral; Future  -     Colorectal Surgery  "Referral; Future    Dense breast  -     *MA Screening Digital Bilateral; Future    Other orders  -     REVIEW OF HEALTH MAINTENANCE PROTOCOL ORDERS        Patient has been advised of split billing requirements and indicates understanding: No    COUNSELING:  Reviewed preventive health counseling, as reflected in patient instructions       Regular exercise       Healthy diet/nutrition    Estimated body mass index is 36.33 kg/m  as calculated from the following:    Height as of this encounter: 1.619 m (5' 3.75\").    Weight as of this encounter: 95.3 kg (210 lb).    Weight management plan: Discussed healthy diet and exercise guidelines    She reports that she has never smoked. She has never used smokeless tobacco.      Counseling Resources:  ATP IV Guidelines  Pooled Cohorts Equation Calculator  Breast Cancer Risk Calculator  BRCA-Related Cancer Risk Assessment: FHS-7 Tool  FRAX Risk Assessment  ICSI Preventive Guidelines  Dietary Guidelines for Americans, 2010  USDA's MyPlate  ASA Prophylaxis  Lung CA Screening    Mahendra Arceo MD  [facility]  Answers for HPI/ROS submitted by the patient on 5/24/2022  If you checked off any problems, how difficult have these problems made it for you to do your work, take care of things at home, or get along with other people?: Somewhat difficult  PHQ9 TOTAL SCORE: 12      "

## 2022-05-28 ENCOUNTER — MYC REFILL (OUTPATIENT)
Dept: FAMILY MEDICINE | Facility: CLINIC | Age: 47
End: 2022-05-28
Payer: COMMERCIAL

## 2022-05-28 DIAGNOSIS — F90.9 ATTENTION DEFICIT HYPERACTIVITY DISORDER (ADHD), UNSPECIFIED ADHD TYPE: ICD-10-CM

## 2022-05-29 LAB
GAMMA INTERFERON BACKGROUND BLD IA-ACNC: 0.04 IU/ML
M TB IFN-G BLD-IMP: NEGATIVE
M TB IFN-G CD4+ BCKGRND COR BLD-ACNC: 9.96 IU/ML
MITOGEN IGNF BCKGRD COR BLD-ACNC: -0.01 IU/ML
MITOGEN IGNF BCKGRD COR BLD-ACNC: 0 IU/ML
QUANTIFERON MITOGEN: 10 IU/ML
QUANTIFERON NIL TUBE: 0.04 IU/ML
QUANTIFERON TB1 TUBE: 0.03 IU/ML
QUANTIFERON TB2 TUBE: 0.04

## 2022-05-30 NOTE — TELEPHONE ENCOUNTER
Routing refill request to provider for review/approval because:  Drug not on the [facility] refill protocol     Last Written Prescription Date:  4/4/21  Last Fill Quantity: 90,  # refills: 0   Last office visit provider:  5/27/22     Requested Prescriptions   Pending Prescriptions Disp Refills     amphetamine-dextroamphetamine (ADDERALL XR) 15 MG 24 hr capsule 90 capsule 0     Sig: Take 1 capsule (15 mg) by mouth daily       There is no refill protocol information for this order          Karma Monroe, RN 05/29/22 7:02 PM

## 2022-05-31 RX ORDER — DEXTROAMPHETAMINE SACCHARATE, AMPHETAMINE ASPARTATE MONOHYDRATE, DEXTROAMPHETAMINE SULFATE AND AMPHETAMINE SULFATE 3.75; 3.75; 3.75; 3.75 MG/1; MG/1; MG/1; MG/1
15 CAPSULE, EXTENDED RELEASE ORAL DAILY
Qty: 90 CAPSULE | Refills: 0 | Status: SHIPPED | OUTPATIENT
Start: 2022-05-31

## 2022-06-02 ENCOUNTER — ANCILLARY PROCEDURE (OUTPATIENT)
Dept: MAMMOGRAPHY | Facility: CLINIC | Age: 47
End: 2022-06-02
Attending: FAMILY MEDICINE
Payer: COMMERCIAL

## 2022-06-02 DIAGNOSIS — R92.30 DENSE BREAST: ICD-10-CM

## 2022-06-02 DIAGNOSIS — Z12.31 VISIT FOR SCREENING MAMMOGRAM: ICD-10-CM

## 2022-06-02 PROCEDURE — 77067 SCR MAMMO BI INCL CAD: CPT | Mod: TC | Performed by: RADIOLOGY

## 2022-06-02 PROCEDURE — 77063 BREAST TOMOSYNTHESIS BI: CPT | Mod: TC | Performed by: RADIOLOGY

## 2022-06-03 LAB
HUMAN PAPILLOMA VIRUS 16 DNA: NEGATIVE
HUMAN PAPILLOMA VIRUS 18 DNA: NEGATIVE
HUMAN PAPILLOMA VIRUS FINAL DIAGNOSIS: NORMAL
HUMAN PAPILLOMA VIRUS OTHER HR: NEGATIVE

## 2022-06-07 LAB
BKR LAB AP GYN ADEQUACY: NORMAL
BKR LAB AP GYN INTERPRETATION: NORMAL
BKR LAB AP GYN OTHER FINDINGS: NORMAL
BKR LAB AP HPV REFLEX: NORMAL
BKR LAB AP PREVIOUS ABNORMAL: NORMAL
PATH REPORT.COMMENTS IMP SPEC: NORMAL
PATH REPORT.COMMENTS IMP SPEC: NORMAL
PATH REPORT.RELEVANT HX SPEC: NORMAL

## 2022-06-20 ENCOUNTER — TELEPHONE (OUTPATIENT)
Dept: FAMILY MEDICINE | Facility: CLINIC | Age: 47
End: 2022-06-20
Payer: COMMERCIAL

## 2022-06-20 NOTE — TELEPHONE ENCOUNTER
3rd request to review and advise.     Radha Linares RN   6/15/2022  7:44 AM CDT         2nd request to review and advise.     Wandy Stovall RN   6/7/2022 10:20 AM CDT         Dr. Arceo     46 yr old with NIL pap, Neg HPV. Endometrial cells noted on pap. Unable to find LMP, but lab result states this correlates with LMP. Please advise if you recommend any follow up for endometrial cells.   Otherwise okay to cotest in 5 years?     Thanks!  Wandy Tierney RN, BSN

## 2022-07-05 ENCOUNTER — TRANSFERRED RECORDS (OUTPATIENT)
Dept: HEALTH INFORMATION MANAGEMENT | Facility: CLINIC | Age: 47
End: 2022-07-05

## 2022-08-10 ENCOUNTER — TRANSFERRED RECORDS (OUTPATIENT)
Dept: HEALTH INFORMATION MANAGEMENT | Facility: CLINIC | Age: 47
End: 2022-08-10

## 2022-08-30 ENCOUNTER — TRANSFERRED RECORDS (OUTPATIENT)
Dept: HEALTH INFORMATION MANAGEMENT | Facility: CLINIC | Age: 47
End: 2022-08-30

## 2022-09-14 ENCOUNTER — TRANSFERRED RECORDS (OUTPATIENT)
Dept: HEALTH INFORMATION MANAGEMENT | Facility: CLINIC | Age: 47
End: 2022-09-14

## 2022-09-15 ENCOUNTER — OFFICE VISIT (OUTPATIENT)
Dept: FAMILY MEDICINE | Facility: CLINIC | Age: 47
End: 2022-09-15
Payer: COMMERCIAL

## 2022-09-15 VITALS
SYSTOLIC BLOOD PRESSURE: 124 MMHG | OXYGEN SATURATION: 98 % | HEART RATE: 81 BPM | HEIGHT: 64 IN | RESPIRATION RATE: 14 BRPM | DIASTOLIC BLOOD PRESSURE: 84 MMHG | BODY MASS INDEX: 36.93 KG/M2 | WEIGHT: 216.3 LBS

## 2022-09-15 DIAGNOSIS — K62.3 RECTAL PROLAPSE: ICD-10-CM

## 2022-09-15 DIAGNOSIS — Z01.818 PREOP GENERAL PHYSICAL EXAM: Primary | ICD-10-CM

## 2022-09-15 PROBLEM — D12.4 BENIGN NEOPLASM OF DESCENDING COLON: Status: ACTIVE | Noted: 2022-08-12

## 2022-09-15 PROBLEM — K63.5 POLYP OF COLON: Status: ACTIVE | Noted: 2022-08-10

## 2022-09-15 LAB — HCG UR QL: NEGATIVE

## 2022-09-15 PROCEDURE — 99214 OFFICE O/P EST MOD 30 MIN: CPT | Performed by: FAMILY MEDICINE

## 2022-09-15 PROCEDURE — 81025 URINE PREGNANCY TEST: CPT | Performed by: FAMILY MEDICINE

## 2022-09-15 ASSESSMENT — PATIENT HEALTH QUESTIONNAIRE - PHQ9
SUM OF ALL RESPONSES TO PHQ QUESTIONS 1-9: 0
10. IF YOU CHECKED OFF ANY PROBLEMS, HOW DIFFICULT HAVE THESE PROBLEMS MADE IT FOR YOU TO DO YOUR WORK, TAKE CARE OF THINGS AT HOME, OR GET ALONG WITH OTHER PEOPLE: NOT DIFFICULT AT ALL
SUM OF ALL RESPONSES TO PHQ QUESTIONS 1-9: 0

## 2022-09-15 NOTE — PATIENT INSTRUCTIONS
Preparing for Your Surgery  Getting started  A nurse will call you to review your health history and instructions. They will give you an arrival time based on your scheduled surgery time. Please be ready to share:    Your doctor's clinic name and phone number    Your medical, surgical and anesthesia history    A list of allergies and sensitivities    A list of medicines, including herbal treatments and over-the-counter drugs    Whether the patient has a legal guardian (ask how to send us the papers in advance)  Please tell us if you're pregnant--or if there's any chance you might be pregnant. Some surgeries may injure a fetus (unborn baby), so they require a pregnancy test. Surgeries that are safe for a fetus don't always need a test, and you can choose whether to have one.   If you have a child who's having surgery, please ask for a copy of Preparing for Your Child's Surgery.    Preparing for surgery    Within 30 days of surgery: Have a pre-op exam (sometimes called an H&P, or History and Physical). This can be done at a clinic or pre-operative center.  ? If you're having a , you may not need this exam. Talk to your care team.    At your pre-op exam, talk to your care team about all medicines you take. If you need to stop any medicines before surgery, ask when to start taking them again.  ? We do this for your safety. Many medicines can make you bleed too much during surgery. Some change how well surgery (anesthesia) drugs work.    Call your insurance company to let them know you're having surgery. (If you don't have insurance, call [phone].)    Call your clinic if there's any change in your health. This includes signs of a cold or flu (sore throat, runny nose, cough, rash, fever). It also includes a scrape or scratch near the surgery site.    If you have questions on the day of surgery, call your hospital or surgery center.  COVID testing  You may need to be tested for COVID-19 before having  surgery. If so, we will give you instructions.  Eating and drinking guidelines  For your safety: Unless your surgeon tells you otherwise, follow the guidelines below.    Eat and drink as usual until 8 hours before surgery. After that, no food or milk.    Drink clear liquids until 2 hours before surgery. These are liquids you can see through, like water, Gatorade and Propel Water. You may also have black coffee and tea (no cream or milk).    Nothing by mouth within 2 hours of surgery. This includes gum, candy and breath mints.    If you drink alcohol: Stop drinking it the night before surgery.    If your care team tells you to take medicine on the morning of surgery, it's okay to take it with a sip of water.  Preventing infection    Shower or bathe the night before and morning of your surgery. Follow the instructions your clinic gave you. (If no instructions, use regular soap.)    Don't shave or clip hair near your surgery site. We'll remove the hair if needed.    Don't smoke or vape the morning of surgery. You may chew nicotine gum up to 2 hours before surgery. A nicotine patch is okay.  ? Note: Some surgeries require you to completely quit smoking and nicotine. Check with your surgeon.    Your care team will make every effort to keep you safe from infection. We will:  ? Clean our hands often with soap and water (or an alcohol-based hand rub).  ? Clean the skin at your surgery site with a special soap that kills germs.  ? Give you a special gown to keep you warm. (Cold raises the risk of infection.)  ? Wear special hair covers, masks, gowns and gloves during surgery.  ? Give antibiotic medicine, if prescribed. Not all surgeries need antibiotics.  What to bring on the day of surgery    Photo ID and insurance card    Copy of your health care directive, if you have one    Glasses and hearing aides (bring cases)  ? You can't wear contacts during surgery    Inhaler and eye drops, if you use them (tell us about these when  you arrive)    CPAP machine or breathing device, if you use them    A few personal items, if spending the night    If you have . . .  ? A pacemaker, ICD (cardiac defibrillator) or other implant: Bring the ID card.  ? An implanted stimulator: Bring the remote control.  ? A legal guardian: Bring a copy of the certified (court-stamped) guardianship papers.  Please remove any jewelry, including body piercings. Leave jewelry and other valuables at home.  If you're going home the day of surgery    You must have a responsible adult drive you home. They should stay with you overnight as well.    If you don't have someone to stay with you, and you aren't safe to go home alone, we may keep you overnight. Insurance often won't pay for this.  After surgery  If it's hard to control your pain or you need more pain medicine, please call your surgeon's office.  Questions?   If you have any questions for your care team, list them here: _________________________________________________________________________________________________________________________________________________________________________ ____________________________________ ____________________________________ ____________________________________  For informational purposes only. Not to replace the advice of your health care provider. Copyright   2003, 2019 [facility]. All rights reserved. Clinically reviewed by Marjorie Underwood MD. [employer] [id number] - REV 07/21.

## 2022-09-15 NOTE — PROGRESS NOTES
[facility]  [address]  [place] [place] [facility] [address]  Phone: [phone]  Fax: [phone]  Primary Provider: Mahendra Arceo  Pre-op Performing Provider: FACUNDO MAN      PREOPERATIVE EVALUATION:  Today's date: 9/15/2022    Yamilet Christensen is a 46 year old female who presents for a preoperative evaluation.    Surgical Information:  Surgery/Procedure: Anal rectal surgery  Surgery Location: [facility]  Surgeon: Dr. Courtney Tapia  Surgery Date: 09/23/2022  Time of Surgery: 02:45PM  Where patient plans to recover: At home with family  Fax number for surgical facility: [phone]    Type of Anesthesia Anticipated: to be determined    Assessment & Plan     The proposed surgical procedure is considered INTERMEDIATE risk.    (Z01.818) Preop general physical exam  (primary encounter diagnosis)  Comment: pt will have  Anal rectal surgery due to ractal prolapse   Plan: HCG qualitative urine        Cleared for the planned surgery     (K62.3) Rectal prolapse  Comment: pt has rectal prolapse. No pain and bleeding now.   Plan: will have ractal surgery.          Risks and Recommendations:  The patient has the following additional risks and recommendations for perioperative complications:   - No identified additional risk factors other than previously addressed    Medication Instructions:  Patient is to take all scheduled medications on the day of surgery  hold over the counter vitamins and supplement one week before the surgery     RECOMMENDATION:  APPROVAL GIVEN to proceed with proposed procedure, without further diagnostic evaluation.         Subjective     HPI related to upcoming procedure: Anal rectal surgery    Preop Questions 9/14/2022   1. Have you ever had a heart attack or stroke? No   2. Have you ever had surgery on your heart or blood vessels, such as a stent placement, a coronary artery bypass, or surgery on an artery in  your head, neck, heart, or legs? No   3. Do you have chest pain with activity? No   4. Do you have a history of  heart failure? No   5. Do you currently have a cold, bronchitis or symptoms of other infection? No   6. Do you have a cough, shortness of breath, or wheezing? No   7. Do you or anyone in your family have previous history of blood clots? No   8. Do you or does anyone in your family have a serious bleeding problem such as prolonged bleeding following surgeries or cuts? No   9. Have you ever had problems with anemia or been told to take iron pills? No   10. Have you had any abnormal blood loss such as black, tarry or bloody stools, or abnormal vaginal bleeding? No   11. Have you ever had a blood transfusion? No   12. Are you willing to have a blood transfusion if it is medically needed before, during, or after your surgery? Yes   13. Have you or any of your relatives ever had problems with anesthesia? No   14. Do you have sleep apnea, excessive snoring or daytime drowsiness? No   15. Do you have any artifical heart valves or other implanted medical devices like a pacemaker, defibrillator, or continuous glucose monitor? No   16. Do you have artificial joints? No   17. Are you allergic to latex? No   18. Is there any chance that you may be pregnant? No       Health Care Directive:  Patient does not have a Health Care Directive or Living Will: Discussed advance care planning with patient; however, patient declined at this time.    Preoperative Review of :   reviewed - no record of controlled substances prescribed.          Review of Systems  Constitutional, neuro, ENT, endocrine, pulmonary, cardiac, gastrointestinal, genitourinary, musculoskeletal, integument and psychiatric systems are negative, except as otherwise noted.    Patient Active Problem List    Diagnosis Date Noted     Benign neoplasm of descending colon 08/12/2022     Priority: Medium     Polyp of colon 08/10/2022     Priority: Medium      "Menorrhagia with regular cycle 05/27/2022     Priority: Medium     PMDD (premenstrual dysphoric disorder) 05/27/2022     Priority: Medium     Class 2 obesity with body mass index (BMI) of 38.0 to 38.9 in adult, unspecified obesity type, unspecified whether serious comorbidity present 07/14/2020     Priority: Medium     Attention deficit hyperactivity disorder (ADHD), unspecified ADHD type 07/14/2020     Priority: Medium     Well adult exam 09/21/2017     Priority: Medium     Premenstrual Disorder      Priority: Medium     Created by Conversion  Replacement Utility updated for latest IMO load         Dysplastic Nevus      Priority: Medium     Created by Conversion  [employer] [facility] Annotation: May 15 2009  7:41PM - Mahendra Arceo: UMBILICUS   5/09          No past medical history on file.  Past Surgical History:   Procedure Laterality Date     HC REDUCTION OF LARGE BREAST      Description: Breast Surgery Reduction Procedure;  Recorded: 07/24/2008;     MAMMOPLASTY REDUCTION Bilateral 2005     Current Outpatient Medications   Medication Sig Dispense Refill     amphetamine-dextroamphetamine (ADDERALL XR) 15 MG 24 hr capsule Take 1 capsule (15 mg) by mouth daily 90 capsule 0     sertraline (ZOLOFT) 50 MG tablet Take 1 tablet (50 mg) by mouth daily 90 tablet 3       Allergies   Allergen Reactions     Misc. Sulfonamide Containing Compounds      Sulfa (Sulfonamide Antibiotics) [Sulfa Drugs] Unknown        Social History     Tobacco Use     Smoking status: Never Smoker     Smokeless tobacco: Never Used   Substance Use Topics     Alcohol use: Yes     Family History   Problem Relation Age of Onset     Hypertension Mother      Hypertension Father      History   Drug Use Unknown         Objective     /84 (BP Location: Right arm, Patient Position: Sitting, Cuff Size: Adult Regular)   Pulse 81   Resp 14   Ht 1.619 m (5' 3.75\")   Wt 98.1 kg (216 lb 4.8 oz)   LMP 09/01/2022   SpO2 98%   BMI 37.42 kg/m      Physical Exam    " GENERAL APPEARANCE: healthy, alert and no distress     EYES: EOMI, PERRL     HENT: ear canals and TM's normal and nose and mouth without ulcers or lesions     NECK: no adenopathy, no asymmetry, masses, or scars and thyroid normal to palpation     RESP: lungs clear to auscultation - no rales, rhonchi or wheezes     CV: regular rates and rhythm, normal S1 S2, no S3 or S4 and no murmur, click or rub     ABDOMEN:  soft, nontender, no HSM or masses and bowel sounds normal     MS: extremities normal- no gross deformities noted, no evidence of inflammation in joints, FROM in all extremities.     SKIN: no suspicious lesions or rashes     NEURO: Normal strength and tone, sensory exam grossly normal, mentation intact and speech normal     PSYCH: mentation appears normal. and affect normal/bright     LYMPHATICS: No cervical adenopathy    No results for input(s): HGB, PLT, INR, NA, POTASSIUM, CR, A1C in the last 88745 hours.     Diagnostics:  Labs pending at this time.  Results will be reviewed when available.   No EKG required, no history of coronary heart disease, significant arrhythmia, peripheral arterial disease or other structural heart disease.    Revised Cardiac Risk Index (RCRI):  The patient has the following serious cardiovascular risks for perioperative complications:   - No serious cardiac risks = 0 points     RCRI Interpretation: 0 points: Class I (very low risk - 0.4% complication rate)           Signed Electronically by: Simona Santana MD  Copy of this evaluation report is provided to requesting physician.      Answers for HPI/ROS submitted by the patient on 9/15/2022  If you checked off any problems, how difficult have these problems made it for you to do your work, take care of things at home, or get along with other people?: Not difficult at all  PHQ9 TOTAL SCORE: 0

## 2022-09-23 PROCEDURE — 88304 TISSUE EXAM BY PATHOLOGIST: CPT | Mod: 26 | Performed by: PATHOLOGY

## 2022-09-23 PROCEDURE — 88304 TISSUE EXAM BY PATHOLOGIST: CPT | Mod: TC,ORL | Performed by: COLON & RECTAL SURGERY

## 2022-09-26 ENCOUNTER — LAB REQUISITION (OUTPATIENT)
Dept: LAB | Facility: CLINIC | Age: 47
End: 2022-09-26
Payer: COMMERCIAL

## 2022-09-28 LAB
PATH REPORT.COMMENTS IMP SPEC: NORMAL
PATH REPORT.COMMENTS IMP SPEC: NORMAL
PATH REPORT.FINAL DX SPEC: NORMAL
PATH REPORT.GROSS SPEC: NORMAL
PATH REPORT.MICROSCOPIC SPEC OTHER STN: NORMAL
PATH REPORT.RELEVANT HX SPEC: NORMAL
PHOTO IMAGE: NORMAL

## 2023-08-27 ENCOUNTER — HEALTH MAINTENANCE LETTER (OUTPATIENT)
Age: 48
End: 2023-08-27

## 2024-02-01 ENCOUNTER — TRANSFERRED RECORDS (OUTPATIENT)
Dept: HEALTH INFORMATION MANAGEMENT | Facility: CLINIC | Age: 49
End: 2024-02-01
Payer: COMMERCIAL

## 2024-02-01 ENCOUNTER — LAB REQUISITION (OUTPATIENT)
Dept: LAB | Facility: CLINIC | Age: 49
End: 2024-02-01

## 2024-02-01 DIAGNOSIS — E56.9 VITAMIN DEFICIENCY, UNSPECIFIED: ICD-10-CM

## 2024-02-01 DIAGNOSIS — L65.9 NONSCARRING HAIR LOSS, UNSPECIFIED: ICD-10-CM

## 2024-02-01 PROCEDURE — 82607 VITAMIN B-12: CPT | Performed by: FAMILY MEDICINE

## 2024-02-01 PROCEDURE — 83550 IRON BINDING TEST: CPT | Performed by: FAMILY MEDICINE

## 2024-02-01 PROCEDURE — 86038 ANTINUCLEAR ANTIBODIES: CPT | Performed by: FAMILY MEDICINE

## 2024-02-01 PROCEDURE — 82627 DEHYDROEPIANDROSTERONE: CPT | Performed by: FAMILY MEDICINE

## 2024-02-01 PROCEDURE — 84403 ASSAY OF TOTAL TESTOSTERONE: CPT | Performed by: FAMILY MEDICINE

## 2024-02-01 PROCEDURE — 82306 VITAMIN D 25 HYDROXY: CPT | Performed by: FAMILY MEDICINE

## 2024-02-01 PROCEDURE — 84630 ASSAY OF ZINC: CPT | Performed by: FAMILY MEDICINE

## 2024-02-01 PROCEDURE — 84439 ASSAY OF FREE THYROXINE: CPT | Performed by: FAMILY MEDICINE

## 2024-02-01 PROCEDURE — 84443 ASSAY THYROID STIM HORMONE: CPT | Performed by: FAMILY MEDICINE

## 2024-02-02 LAB
DHEA-S SERPL-MCNC: 76 UG/DL (ref 35–430)
IRON BINDING CAPACITY (ROCHE): 297 UG/DL (ref 240–430)
IRON SATN MFR SERPL: 16 % (ref 15–46)
IRON SERPL-MCNC: 49 UG/DL (ref 37–145)
T4 FREE SERPL-MCNC: 1.44 NG/DL (ref 0.9–1.7)
TSH SERPL DL<=0.005 MIU/L-ACNC: 1.52 UIU/ML (ref 0.3–4.2)
VIT B12 SERPL-MCNC: 429 PG/ML (ref 232–1245)
VIT D+METAB SERPL-MCNC: 40 NG/ML (ref 20–50)

## 2024-02-03 LAB — ZINC SERPL-MCNC: 66 UG/DL

## 2024-02-06 LAB
ANA PAT SER IF-IMP: ABNORMAL
ANA SER QL IF: POSITIVE
ANA TITR SER IF: ABNORMAL {TITER}
TESTOST SERPL-MCNC: 9 NG/DL (ref 8–60)

## 2024-02-07 ENCOUNTER — MEDICAL CORRESPONDENCE (OUTPATIENT)
Dept: HEALTH INFORMATION MANAGEMENT | Facility: CLINIC | Age: 49
End: 2024-02-07
Payer: COMMERCIAL

## 2024-02-08 ENCOUNTER — TRANSCRIBE ORDERS (OUTPATIENT)
Dept: OTHER | Age: 49
End: 2024-02-08

## 2024-02-08 ENCOUNTER — TELEPHONE (OUTPATIENT)
Dept: DERMATOLOGY | Facility: CLINIC | Age: 49
End: 2024-02-08
Payer: COMMERCIAL

## 2024-02-08 DIAGNOSIS — L65.9 ALOPECIA: Primary | ICD-10-CM

## 2024-02-08 NOTE — TELEPHONE ENCOUNTER
Chart reviewed by RN. Negative for sudden/rapid hairloss. Patient was also referred to [facility]. Next available is appropriate.     Radha ARRIOLA

## 2024-02-08 NOTE — TELEPHONE ENCOUNTER
This encounter is being sent to inform the clinic that this patient has a referral from Mahendra Arceo MD @ [facility], for the diagnoses of Alopecia and has requested that this patient be seen within Urgent: 3-5 Days. Based on the availability of our provider(s), we are unable to accommodate this request.    Were all sites offered this patient?  Yes    Does scheduling algorithm request to schedule next available?  Patient has been scheduled for the first available opening with Suzi Lizarraga MD on 2/13/25.  We have informed the patient that the clinic will review their referral and reach out if a sooner appointment is medically necessary.     Did inform patient scheduling year out and was placed on a wait list.

## 2024-06-05 DIAGNOSIS — E66.812 CLASS 2 OBESITY WITH BODY MASS INDEX (BMI) OF 38.0 TO 38.9 IN ADULT, UNSPECIFIED OBESITY TYPE, UNSPECIFIED WHETHER SERIOUS COMORBIDITY PRESENT: Primary | ICD-10-CM

## 2024-08-06 PROCEDURE — 88304 TISSUE EXAM BY PATHOLOGIST: CPT | Mod: TC,ORL | Performed by: PLASTIC SURGERY

## 2024-08-06 PROCEDURE — 88304 TISSUE EXAM BY PATHOLOGIST: CPT | Mod: 26 | Performed by: PATHOLOGY

## 2024-08-07 ENCOUNTER — LAB REQUISITION (OUTPATIENT)
Dept: LAB | Facility: CLINIC | Age: 49
End: 2024-08-07
Payer: COMMERCIAL

## 2024-08-11 ENCOUNTER — HEALTH MAINTENANCE LETTER (OUTPATIENT)
Age: 49
End: 2024-08-11

## 2024-10-20 ENCOUNTER — HEALTH MAINTENANCE LETTER (OUTPATIENT)
Age: 49
End: 2024-10-20